# Patient Record
Sex: FEMALE | Race: WHITE | NOT HISPANIC OR LATINO | Employment: FULL TIME | URBAN - METROPOLITAN AREA
[De-identification: names, ages, dates, MRNs, and addresses within clinical notes are randomized per-mention and may not be internally consistent; named-entity substitution may affect disease eponyms.]

---

## 2017-01-30 DIAGNOSIS — Z01.812 PRE-OPERATIVE LABORATORY EXAMINATION: ICD-10-CM

## 2017-01-30 LAB
ANION GAP SERPL CALC-SCNC: 9 MMOL/L (ref 0–11.9)
BASOPHILS # BLD AUTO: 0.8 % (ref 0–1.8)
BASOPHILS # BLD: 0.05 K/UL (ref 0–0.12)
BUN SERPL-MCNC: 15 MG/DL (ref 8–22)
CALCIUM SERPL-MCNC: 9.7 MG/DL (ref 8.5–10.5)
CHLORIDE SERPL-SCNC: 105 MMOL/L (ref 96–112)
CO2 SERPL-SCNC: 22 MMOL/L (ref 20–33)
CREAT SERPL-MCNC: 0.72 MG/DL (ref 0.5–1.4)
EOSINOPHIL # BLD AUTO: 0.46 K/UL (ref 0–0.51)
EOSINOPHIL NFR BLD: 7.3 % (ref 0–6.9)
ERYTHROCYTE [DISTWIDTH] IN BLOOD BY AUTOMATED COUNT: 40.6 FL (ref 35.9–50)
GFR SERPL CREATININE-BSD FRML MDRD: >60 ML/MIN/1.73 M 2
GLUCOSE SERPL-MCNC: 77 MG/DL (ref 65–99)
HCG SERPL QL: NEGATIVE
HCT VFR BLD AUTO: 46.6 % (ref 37–47)
HGB BLD-MCNC: 15.4 G/DL (ref 12–16)
IMM GRANULOCYTES # BLD AUTO: 0.02 K/UL (ref 0–0.11)
IMM GRANULOCYTES NFR BLD AUTO: 0.3 % (ref 0–0.9)
LYMPHOCYTES # BLD AUTO: 1.88 K/UL (ref 1–4.8)
LYMPHOCYTES NFR BLD: 29.8 % (ref 22–41)
MCH RBC QN AUTO: 31.6 PG (ref 27–33)
MCHC RBC AUTO-ENTMCNC: 33 G/DL (ref 33.6–35)
MCV RBC AUTO: 95.5 FL (ref 81.4–97.8)
MONOCYTES # BLD AUTO: 0.49 K/UL (ref 0–0.85)
MONOCYTES NFR BLD AUTO: 7.8 % (ref 0–13.4)
NEUTROPHILS # BLD AUTO: 3.4 K/UL (ref 2–7.15)
NEUTROPHILS NFR BLD: 54 % (ref 44–72)
NRBC # BLD AUTO: 0 K/UL
NRBC BLD AUTO-RTO: 0 /100 WBC
PLATELET # BLD AUTO: 267 K/UL (ref 164–446)
PMV BLD AUTO: 10.8 FL (ref 9–12.9)
POTASSIUM SERPL-SCNC: 3.6 MMOL/L (ref 3.6–5.5)
RBC # BLD AUTO: 4.88 M/UL (ref 4.2–5.4)
SODIUM SERPL-SCNC: 136 MMOL/L (ref 135–145)
WBC # BLD AUTO: 6.3 K/UL (ref 4.8–10.8)

## 2017-01-30 PROCEDURE — 36415 COLL VENOUS BLD VENIPUNCTURE: CPT

## 2017-01-30 PROCEDURE — 84703 CHORIONIC GONADOTROPIN ASSAY: CPT

## 2017-01-30 PROCEDURE — 80048 BASIC METABOLIC PNL TOTAL CA: CPT

## 2017-01-30 PROCEDURE — 85025 COMPLETE CBC W/AUTO DIFF WBC: CPT

## 2017-01-30 RX ORDER — IBUPROFEN 200 MG
600 TABLET ORAL EVERY 6 HOURS PRN
Status: ON HOLD | COMMUNITY
End: 2017-02-06

## 2017-02-06 ENCOUNTER — APPOINTMENT (OUTPATIENT)
Dept: RADIOLOGY | Facility: MEDICAL CENTER | Age: 37
End: 2017-02-06
Attending: OBSTETRICS & GYNECOLOGY
Payer: COMMERCIAL

## 2017-02-06 ENCOUNTER — HOSPITAL ENCOUNTER (OUTPATIENT)
Facility: MEDICAL CENTER | Age: 37
End: 2017-02-07
Attending: OBSTETRICS & GYNECOLOGY | Admitting: OBSTETRICS & GYNECOLOGY
Payer: COMMERCIAL

## 2017-02-06 PROBLEM — N85.2 ENLARGED UTERUS: Status: ACTIVE | Noted: 2017-02-06

## 2017-02-06 PROBLEM — N85.2 ENLARGED UTERUS: Status: RESOLVED | Noted: 2017-02-06 | Resolved: 2017-02-06

## 2017-02-06 LAB
BASOPHILS # BLD AUTO: 0.1 % (ref 0–1.8)
BASOPHILS # BLD: 0.02 K/UL (ref 0–0.12)
EKG IMPRESSION: NORMAL
EOSINOPHIL # BLD AUTO: 0.01 K/UL (ref 0–0.51)
EOSINOPHIL NFR BLD: 0.1 % (ref 0–6.9)
ERYTHROCYTE [DISTWIDTH] IN BLOOD BY AUTOMATED COUNT: 41.1 FL (ref 35.9–50)
HCG UR QL: NEGATIVE
HCT VFR BLD AUTO: 44.9 % (ref 37–47)
HGB BLD-MCNC: 15.4 G/DL (ref 12–16)
IMM GRANULOCYTES # BLD AUTO: 0.04 K/UL (ref 0–0.11)
IMM GRANULOCYTES NFR BLD AUTO: 0.3 % (ref 0–0.9)
LYMPHOCYTES # BLD AUTO: 0.62 K/UL (ref 1–4.8)
LYMPHOCYTES NFR BLD: 4.3 % (ref 22–41)
MCH RBC QN AUTO: 32.7 PG (ref 27–33)
MCHC RBC AUTO-ENTMCNC: 34.3 G/DL (ref 33.6–35)
MCV RBC AUTO: 95.3 FL (ref 81.4–97.8)
MONOCYTES # BLD AUTO: 0.38 K/UL (ref 0–0.85)
MONOCYTES NFR BLD AUTO: 2.7 % (ref 0–13.4)
NEUTROPHILS # BLD AUTO: 13.2 K/UL (ref 2–7.15)
NEUTROPHILS NFR BLD: 92.5 % (ref 44–72)
NRBC # BLD AUTO: 0 K/UL
NRBC BLD AUTO-RTO: 0 /100 WBC
PLATELET # BLD AUTO: 255 K/UL (ref 164–446)
PMV BLD AUTO: 9.9 FL (ref 9–12.9)
RBC # BLD AUTO: 4.71 M/UL (ref 4.2–5.4)
SP GR UR REFRACTOMETRY: 1.02
WBC # BLD AUTO: 14.3 K/UL (ref 4.8–10.8)

## 2017-02-06 PROCEDURE — 88307 TISSUE EXAM BY PATHOLOGIST: CPT

## 2017-02-06 PROCEDURE — G0378 HOSPITAL OBSERVATION PER HR: HCPCS

## 2017-02-06 PROCEDURE — 160035 HCHG PACU - 1ST 60 MINS PHASE I: Performed by: OBSTETRICS & GYNECOLOGY

## 2017-02-06 PROCEDURE — 700102 HCHG RX REV CODE 250 W/ 637 OVERRIDE(OP)

## 2017-02-06 PROCEDURE — 81025 URINE PREGNANCY TEST: CPT

## 2017-02-06 PROCEDURE — 160009 HCHG ANES TIME/MIN: Performed by: OBSTETRICS & GYNECOLOGY

## 2017-02-06 PROCEDURE — 160002 HCHG RECOVERY MINUTES (STAT): Performed by: OBSTETRICS & GYNECOLOGY

## 2017-02-06 PROCEDURE — 500025 HCHG ARMREST, CRADLE FOAM: Performed by: OBSTETRICS & GYNECOLOGY

## 2017-02-06 PROCEDURE — 71275 CT ANGIOGRAPHY CHEST: CPT

## 2017-02-06 PROCEDURE — 110382 HCHG SHELL REV 271: Performed by: OBSTETRICS & GYNECOLOGY

## 2017-02-06 PROCEDURE — 700111 HCHG RX REV CODE 636 W/ 250 OVERRIDE (IP)

## 2017-02-06 PROCEDURE — A9270 NON-COVERED ITEM OR SERVICE: HCPCS | Performed by: OBSTETRICS & GYNECOLOGY

## 2017-02-06 PROCEDURE — 501664 HCHG TUBING, FILTER STRYKER: Performed by: OBSTETRICS & GYNECOLOGY

## 2017-02-06 PROCEDURE — 700111 HCHG RX REV CODE 636 W/ 250 OVERRIDE (IP): Performed by: OBSTETRICS & GYNECOLOGY

## 2017-02-06 PROCEDURE — 71010 DX-CHEST-LIMITED (1 VIEW): CPT

## 2017-02-06 PROCEDURE — 160025 RECOVERY II MINUTES (STATS): Performed by: OBSTETRICS & GYNECOLOGY

## 2017-02-06 PROCEDURE — 93005 ELECTROCARDIOGRAM TRACING: CPT | Performed by: OBSTETRICS & GYNECOLOGY

## 2017-02-06 PROCEDURE — 160042 HCHG SURGERY MINUTES - EA ADDL 1 MIN LEVEL 5: Performed by: OBSTETRICS & GYNECOLOGY

## 2017-02-06 PROCEDURE — 700102 HCHG RX REV CODE 250 W/ 637 OVERRIDE(OP): Performed by: OBSTETRICS & GYNECOLOGY

## 2017-02-06 PROCEDURE — 700101 HCHG RX REV CODE 250

## 2017-02-06 PROCEDURE — A9270 NON-COVERED ITEM OR SERVICE: HCPCS

## 2017-02-06 PROCEDURE — 160031 HCHG SURGERY MINUTES - 1ST 30 MINS LEVEL 5: Performed by: OBSTETRICS & GYNECOLOGY

## 2017-02-06 PROCEDURE — 160046 HCHG PACU - 1ST 60 MINS PHASE II: Performed by: OBSTETRICS & GYNECOLOGY

## 2017-02-06 PROCEDURE — 110371 HCHG SHELL REV 272: Performed by: OBSTETRICS & GYNECOLOGY

## 2017-02-06 PROCEDURE — 503108 HCHG CANNULA SEAL 8.5-13MM: Performed by: OBSTETRICS & GYNECOLOGY

## 2017-02-06 PROCEDURE — 502714 HCHG ROBOTIC SURGERY SERVICES: Performed by: OBSTETRICS & GYNECOLOGY

## 2017-02-06 PROCEDURE — 502240 HCHG MISC OR SUPPLY RC 0272: Performed by: OBSTETRICS & GYNECOLOGY

## 2017-02-06 PROCEDURE — 36415 COLL VENOUS BLD VENIPUNCTURE: CPT

## 2017-02-06 PROCEDURE — A4606 OXYGEN PROBE USED W OXIMETER: HCPCS | Performed by: OBSTETRICS & GYNECOLOGY

## 2017-02-06 PROCEDURE — 93010 ELECTROCARDIOGRAM REPORT: CPT | Performed by: INTERNAL MEDICINE

## 2017-02-06 PROCEDURE — 500868 HCHG NEEDLE, SURGI(VARES): Performed by: OBSTETRICS & GYNECOLOGY

## 2017-02-06 PROCEDURE — 501330 HCHG SET, CYSTO IRRIG TUBING: Performed by: OBSTETRICS & GYNECOLOGY

## 2017-02-06 PROCEDURE — 160048 HCHG OR STATISTICAL LEVEL 1-5: Performed by: OBSTETRICS & GYNECOLOGY

## 2017-02-06 PROCEDURE — 160036 HCHG PACU - EA ADDL 30 MINS PHASE I: Performed by: OBSTETRICS & GYNECOLOGY

## 2017-02-06 PROCEDURE — 160047 HCHG PACU  - EA ADDL 30 MINS PHASE II: Performed by: OBSTETRICS & GYNECOLOGY

## 2017-02-06 PROCEDURE — 501838 HCHG SUTURE GENERAL: Performed by: OBSTETRICS & GYNECOLOGY

## 2017-02-06 PROCEDURE — 85025 COMPLETE CBC W/AUTO DIFF WBC: CPT

## 2017-02-06 PROCEDURE — 502679 HCHG MANIPULATOR, UTRN DAVINCI: Performed by: OBSTETRICS & GYNECOLOGY

## 2017-02-06 RX ORDER — SIMETHICONE 80 MG
80 TABLET,CHEWABLE ORAL EVERY 8 HOURS PRN
Status: DISCONTINUED | OUTPATIENT
Start: 2017-02-06 | End: 2017-02-07 | Stop reason: HOSPADM

## 2017-02-06 RX ORDER — HYDROCODONE BITARTRATE AND ACETAMINOPHEN 5; 325 MG/1; MG/1
1 TABLET ORAL EVERY 4 HOURS PRN
Status: DISCONTINUED | OUTPATIENT
Start: 2017-02-06 | End: 2017-02-06

## 2017-02-06 RX ORDER — OXYCODONE HCL 5 MG/5 ML
SOLUTION, ORAL ORAL
Status: COMPLETED
Start: 2017-02-06 | End: 2017-02-06

## 2017-02-06 RX ORDER — MAGNESIUM HYDROXIDE 1200 MG/15ML
LIQUID ORAL
Status: DISCONTINUED | OUTPATIENT
Start: 2017-02-06 | End: 2017-02-06 | Stop reason: HOSPADM

## 2017-02-06 RX ORDER — IBUPROFEN 600 MG/1
600 TABLET ORAL EVERY 6 HOURS
Status: DISCONTINUED | OUTPATIENT
Start: 2017-02-06 | End: 2017-02-07 | Stop reason: HOSPADM

## 2017-02-06 RX ORDER — SODIUM CHLORIDE, SODIUM LACTATE, POTASSIUM CHLORIDE, CALCIUM CHLORIDE 600; 310; 30; 20 MG/100ML; MG/100ML; MG/100ML; MG/100ML
1000 INJECTION, SOLUTION INTRAVENOUS
Status: COMPLETED | OUTPATIENT
Start: 2017-02-06 | End: 2017-02-06

## 2017-02-06 RX ORDER — IBUPROFEN 200 MG
TABLET ORAL
Status: COMPLETED
Start: 2017-02-06 | End: 2017-02-06

## 2017-02-06 RX ORDER — SODIUM CHLORIDE, SODIUM LACTATE, POTASSIUM CHLORIDE, CALCIUM CHLORIDE 600; 310; 30; 20 MG/100ML; MG/100ML; MG/100ML; MG/100ML
1000 INJECTION, SOLUTION INTRAVENOUS
OUTPATIENT
Start: 2017-02-06

## 2017-02-06 RX ORDER — SODIUM CHLORIDE, SODIUM LACTATE, POTASSIUM CHLORIDE, CALCIUM CHLORIDE 600; 310; 30; 20 MG/100ML; MG/100ML; MG/100ML; MG/100ML
INJECTION, SOLUTION INTRAVENOUS
Status: DISCONTINUED | OUTPATIENT
Start: 2017-02-06 | End: 2017-02-06 | Stop reason: HOSPADM

## 2017-02-06 RX ORDER — BUPIVACAINE HYDROCHLORIDE AND EPINEPHRINE 2.5; 5 MG/ML; UG/ML
INJECTION, SOLUTION EPIDURAL; INFILTRATION; INTRACAUDAL; PERINEURAL
Status: DISCONTINUED | OUTPATIENT
Start: 2017-02-06 | End: 2017-02-06 | Stop reason: HOSPADM

## 2017-02-06 RX ORDER — OXYCODONE HYDROCHLORIDE AND ACETAMINOPHEN 5; 325 MG/1; MG/1
1 TABLET ORAL EVERY 4 HOURS PRN
Status: DISCONTINUED | OUTPATIENT
Start: 2017-02-06 | End: 2017-02-07 | Stop reason: HOSPADM

## 2017-02-06 RX ORDER — FUROSEMIDE 10 MG/ML
INJECTION INTRAMUSCULAR; INTRAVENOUS
Status: COMPLETED
Start: 2017-02-06 | End: 2017-02-06

## 2017-02-06 RX ORDER — METOCLOPRAMIDE HYDROCHLORIDE 5 MG/ML
10 INJECTION INTRAMUSCULAR; INTRAVENOUS EVERY 4 HOURS PRN
Status: DISCONTINUED | OUTPATIENT
Start: 2017-02-06 | End: 2017-02-07 | Stop reason: HOSPADM

## 2017-02-06 RX ORDER — FUROSEMIDE 10 MG/ML
10 INJECTION INTRAMUSCULAR; INTRAVENOUS ONCE
Status: COMPLETED | OUTPATIENT
Start: 2017-02-06 | End: 2017-02-06

## 2017-02-06 RX ORDER — SODIUM CHLORIDE, SODIUM LACTATE, POTASSIUM CHLORIDE, CALCIUM CHLORIDE 600; 310; 30; 20 MG/100ML; MG/100ML; MG/100ML; MG/100ML
INJECTION, SOLUTION INTRAVENOUS CONTINUOUS
Status: DISCONTINUED | OUTPATIENT
Start: 2017-02-06 | End: 2017-02-07 | Stop reason: HOSPADM

## 2017-02-06 RX ORDER — LIDOCAINE HYDROCHLORIDE 10 MG/ML
INJECTION, SOLUTION INFILTRATION; PERINEURAL
Status: COMPLETED
Start: 2017-02-06 | End: 2017-02-06

## 2017-02-06 RX ORDER — ACETAMINOPHEN 500 MG
1000 TABLET ORAL 2 TIMES DAILY PRN
COMMUNITY
End: 2018-10-10

## 2017-02-06 RX ORDER — HYDROCODONE BITARTRATE AND ACETAMINOPHEN 5; 325 MG/1; MG/1
2 TABLET ORAL EVERY 6 HOURS PRN
Status: DISCONTINUED | OUTPATIENT
Start: 2017-02-06 | End: 2017-02-06

## 2017-02-06 RX ORDER — OXYCODONE HYDROCHLORIDE AND ACETAMINOPHEN 5; 325 MG/1; MG/1
TABLET ORAL
Status: COMPLETED
Start: 2017-02-06 | End: 2017-02-06

## 2017-02-06 RX ORDER — ONDANSETRON 2 MG/ML
4 INJECTION INTRAMUSCULAR; INTRAVENOUS EVERY 6 HOURS PRN
Status: DISCONTINUED | OUTPATIENT
Start: 2017-02-06 | End: 2017-02-07 | Stop reason: HOSPADM

## 2017-02-06 RX ORDER — PROMETHAZINE HYDROCHLORIDE 12.5 MG/1
12.5 SUPPOSITORY RECTAL EVERY 4 HOURS PRN
Status: DISCONTINUED | OUTPATIENT
Start: 2017-02-06 | End: 2017-02-07 | Stop reason: HOSPADM

## 2017-02-06 RX ADMIN — OXYCODONE AND ACETAMINOPHEN 1 TABLET: 5; 325 TABLET ORAL at 19:30

## 2017-02-06 RX ADMIN — FUROSEMIDE 10 MG: 10 INJECTION, SOLUTION INTRAVENOUS at 23:21

## 2017-02-06 RX ADMIN — LIDOCAINE HYDROCHLORIDE 0.3 ML: 10 INJECTION, SOLUTION INFILTRATION; PERINEURAL at 07:15

## 2017-02-06 RX ADMIN — FUROSEMIDE 10 MG: 10 INJECTION, SOLUTION INTRAMUSCULAR; INTRAVENOUS at 16:45

## 2017-02-06 RX ADMIN — IBUPROFEN 600 MG: 600 TABLET, FILM COATED ORAL at 18:00

## 2017-02-06 RX ADMIN — IBUPROFEN 600 MG: 200 TABLET, FILM COATED ORAL at 18:00

## 2017-02-06 RX ADMIN — OXYCODONE HYDROCHLORIDE AND ACETAMINOPHEN 1 TABLET: 5; 325 TABLET ORAL at 19:30

## 2017-02-06 RX ADMIN — OXYCODONE HYDROCHLORIDE 10 MG: 5 SOLUTION ORAL at 10:35

## 2017-02-06 RX ADMIN — IBUPROFEN 600 MG: 600 TABLET, FILM COATED ORAL at 23:23

## 2017-02-06 RX ADMIN — FENTANYL CITRATE 25 MCG: 50 INJECTION, SOLUTION INTRAMUSCULAR; INTRAVENOUS at 14:45

## 2017-02-06 RX ADMIN — FENTANYL CITRATE 25 MCG: 50 INJECTION, SOLUTION INTRAMUSCULAR; INTRAVENOUS at 10:30

## 2017-02-06 RX ADMIN — SODIUM CHLORIDE, SODIUM LACTATE, POTASSIUM CHLORIDE, CALCIUM CHLORIDE 1000 ML: 600; 310; 30; 20 INJECTION, SOLUTION INTRAVENOUS at 07:15

## 2017-02-06 RX ADMIN — FENTANYL CITRATE 25 MCG: 50 INJECTION, SOLUTION INTRAMUSCULAR; INTRAVENOUS at 10:57

## 2017-02-06 RX ADMIN — FUROSEMIDE 10 MG: 10 INJECTION INTRAMUSCULAR; INTRAVENOUS at 16:45

## 2017-02-06 RX ADMIN — FENTANYL CITRATE 25 MCG: 50 INJECTION, SOLUTION INTRAMUSCULAR; INTRAVENOUS at 20:45

## 2017-02-06 ASSESSMENT — PAIN SCALES - GENERAL
PAINLEVEL_OUTOF10: 2
PAINLEVEL_OUTOF10: 3
PAINLEVEL_OUTOF10: 2
PAINLEVEL_OUTOF10: 1
PAINLEVEL_OUTOF10: 5
PAINLEVEL_OUTOF10: 0
PAINLEVEL_OUTOF10: 4
PAINLEVEL_OUTOF10: 4
PAINLEVEL_OUTOF10: 5
PAINLEVEL_OUTOF10: 1

## 2017-02-06 ASSESSMENT — LIFESTYLE VARIABLES
ALCOHOL_USE: NO
EVER_SMOKED: NEVER

## 2017-02-06 NOTE — H&P
DATE OF OPERATION:  2017    CHIEF COMPLAINT:  Heavy painful periods, pelvic pressure, enlarged fibroid   uterus, heterogeneous uterus, rule out adenomyosis.    HISTORY OF PRESENT ILLNESS:  Patient is a 36-year-old , who presented to   my office for a second opinion regarding hysterectomy.  The patient states   that she has been having pain and really heavy periods which are closer   together over the last year.  She uses a super plus tampon and a super plus   pad and is so heavy that she flows through  she has clots which are getting   bigger.  She complains of discomfort and pressure.  Every day she has a   pressure sensation.  She was seen for a Pap smear by Dr. Frazier and this   returned as normal.  She was sent for pelvic ultrasound.  This was done on   2016 and this showed the uterus measuring 8.9x5.5x8x4.4 cm with the   myometrium that was inhomogeneous.  There was also a 4x3.6x4.3 cm myometrial   lesion consistent with a uterine fibroids and a smaller one measuring 1-2 cm   in diameter.  The lining was 1.12 cm and the ovaries appeared normal.  The   patient was seen by Dr. Castellon and recommended to have a vaginal hysterectomy.    She came to me for a second opinion.  The patient was tried on Aygestin to try   to stop her bleeding and she was quite tired on it, but her periods were   better.  After discussion of the treatment options, the patient has elected to   proceed with a da Fatimah laparoscopic total hysterectomy with ovarian   preservation if possible.  Risks, benefits, alternatives and procedure were   discussed with the patient in detail and she agrees to proceed.    PAST MEDICAL HISTORY:  Anemia, kidney stones, a DVT in 2015 from a PICC   line her right arm.  She also was diagnosed with an autoimmune disease.  She   sought alternative treatment and now is doing very well for.  She does have   anxiety.    PAST SURGICAL HISTORY:  None.    MENSTRUAL HISTORY:  The patient underwent  menarche at age 10.  She has periods   every 24 days.  She flows for 6-7 days.  They are quite heavy and painful.    PAST OBSTETRICAL HISTORY:  She has had 2 vaginal deliveries, the last one in   .    MEDICATIONS:  Cymbalta daily.    FAMILY HISTORY:  Father  at age 52 from a heart attack and mother has   lupus and RA.  Sister at 43 had a kidney transplant at 32, maternal   grandmother with breast cancer at 36 years old.    SOCIAL HISTORY:  The patient does not smoke, drinks 1-2 drinks nightly with   dinner.    ALLERGIES:  ADHESIVES.  No known drug allergies.    PHYSICAL EXAMINATION:  VITAL SIGNS:  The patient's blood pressure is 130/70, her weight is 172,   height is 5.8 and three quarters, please see EPIC vitals.  HEENT:  Within normal limits.  NECK:  Supple, without lymphadenopathy or thyromegaly.  CARDIOVASCULAR:  Regular rate and rhythm.  LUNGS:  Clear to auscultation.  BACK:  No CVA tenderness.  ABDOMEN:  Soft and nontender, nondistended.  No masses are palpable.  PELVIC:  EGBUS appears normal.  Vagina appears normal.  Cervix appears to be   high.  The uterus is high and hard to feel.  No masses are palpable.  RECTAL:  On rectal examination, the posterior cul-de-sac.  There is some   fullness and pressure with examination.  EXTREMITIES:  Benign.    ASSESSMENT:  1.  A 35-year-old .  2.  Pelvic pressure and enlarged fibroid uterus.  3.  Heavy painful periods over the last year.  4.  Constipation.  5.  Frequency of urination.  6.  Enlarged fibroid uterus.  7.  Heterogeneous uterus.    PLAN:  The patient is scheduled for a da Fatimah laparoscopic total hysterectomy   with ovarian preservation if possible.  We will try reduced port if possible.    Risks, benefits, alternatives and procedure were discussed with the patient   in detail and she agrees to proceed.  Preoperative labs will be obtained.    Preoperative antibiotics will be administered.  She was given a prescription   for Percocet 5/325, #60 and  Phenergan suppositories 25 mg #6.       ____________________________________     MD HERNANDEZ CHICAS / REG    DD:  02/05/2017 21:37:55  DT:  02/05/2017 23:21:33    D#:  [id number]  Job#:  [id number]    cc: GABRIELLA SOTO MD

## 2017-02-06 NOTE — IP AVS SNAPSHOT
Egodeus Access Code: Activation code not generated  Current Egodeus Status: Active    [employer]art  A secure, online tool to manage your health information     [employer]’s Egodeus® is a secure, online tool that connects you to your personalized health information from the privacy of your home -- day or night - making it very easy for you to manage your healthcare. Once the activation process is completed, you can even access your medical information using the Egodeus christie, which is available for free in the Apple Christie store or Google Play store.     Egodeus provides the following levels of access (as shown below):   My Chart Features   [facility] Primary Care Doctor [facility]  Specialists [facility]  Urgent  Care Non-[facility]  Primary Care  Doctor   Email your healthcare team securely and privately 24/7 X X X X   Manage appointments: schedule your next appointment; view details of past/upcoming appointments X      Request prescription refills. X      View recent personal medical records, including lab and immunizations X X X X   View health record, including health history, allergies, medications X X X X   Read reports about your outpatient visits, procedures, consult and ER notes X X X X   See your discharge summary, which is a recap of your hospital and/or ER visit that includes your diagnosis, lab results, and care plan. X X       How to register for Egodeus:  1. Go to  [email or web address].  2. Click on the Sign Up Now box, which takes you to the New Member Sign Up page. You will need to provide the following information:  a. Enter your Egodeus Access Code exactly as it appears at the top of this page. (You will not need to use this code after you’ve completed the sign-up process. If you do not sign up before the expiration date, you must request a new code.)   b. Enter your date of birth.   c. Enter your home email address.   d. Click Submit, and follow the next screen’s instructions.  3. Create a Egodeus ID. This will  be your InOpen login ID and cannot be changed, so think of one that is secure and easy to remember.  4. Create a InOpen password. You can change your password at any time.  5. Enter your Password Reset Question and Answer. This can be used at a later time if you forget your password.   6. Enter your e-mail address. This allows you to receive e-mail notifications when new information is available in InOpen.  7. Click Sign Up. You can now view your health information.    For assistance activating your InOpen account, call [phone]

## 2017-02-06 NOTE — OR NURSING
Back from CT scan decreased sob when laying down, 02 sats now 98% on 2 liters, HR  to 113  Pain however is increasing in upper abd 3/10   Mild bleeding from surgical site in abd at belly button   Dr Dailey notified of pt status  EKG at bedside

## 2017-02-06 NOTE — OR NURSING
trialed pt off of o2   Pt desating to 86 %  Lungs clear all fields   HR increasing to 135 with talking  md Dr. Dailey notified

## 2017-02-06 NOTE — OR SURGEON
Immediate Post-Operative Note      PreOp Diagnosis: Pelvic pressure, Heavy painful periods, pelvic pressure, frequency of urination    PostOp Diagnosis: Same    Procedure(s):  HYSTERECTOMY ROBOTIC SINGLE SITE PLUS ONE BILATERAL SALPINGECTOMY  - Wound Class: Clean Contaminated  MODIFIED MCCALLS CULDOPLASTY  CYSTOSCOPY - Wound Class: Clean Contaminated    Surgeon(s):  Zaynab Dailey M.D.    Anesthesiologist/Type of Anesthesia:  Anesthesiologist: Rodolfo Gillis M.D./General    Surgical Staff:  Assistant: Andre Murray R.N.  Circulator: Maciej Rivera R.N.; Ashley Adair R.N.  Relief Circulator: Erasmo Jauregui R.N.  Scrub Person: Natty Mc; Yesenia Verma    Specimen: Uterus, bilateral tubes    Estimated Blood Loss: 20 cc    Findings: Uterus is enlarged with 5 cm posterior fibroid off to the right side, normal tubes and ovaries, normal bladder postop, great support and vaginal length    Complications: None        2/6/2017 9:48 AM Zaynab Dailey

## 2017-02-06 NOTE — OR NURSING
Arrived from pacu alert and oriented x 4, smiling, reporting pain 1-2/10 lower abd pain at this time.   at bedside  Olmstead in place   Initial vitals taken, 02 sat 87 % pt placed on 1 liter NC oxygen and o2 sat increased to 97% informed pt about discharge process and expectations that needed to be met prior to being released home. She and  understanding of instructions. Pt given 8 0z cup of ginger ale and otter pop, bed rails up and call light in place

## 2017-02-06 NOTE — OP REPORT
DATE OF SERVICE:  02/06/2017    PREOPERATIVE DIAGNOSES:  Pelvic pressure, heavy painful periods, pelvic pain,   frequency of urination.    POSTOPERATIVE DIAGNOSES:  Pelvic pressure, heavy painful periods, pelvic pain,   frequency of urination.    PROCEDURES PERFORMED:  Da Fatimah laparoscopic total hysterectomy using single   site +1 bilateral salpingectomy, modified Casiano's culdoplasty, and   cystoscopy.    SURGEON:  Zaynab Dailey MD    ASSISTANT:  JULIET Jimenez    ANESTHESIOLOGIST:  Rodolfo Lafleur MD    ANESTHESIA:  General endotracheal.    ESTIMATED BLOOD LOSS:  20 mL.    SPECIMEN:  Uterus, bilateral tubes.    FINDINGS:  The uterus was enlarged with a 5 cm posterior off to the right   fibroid.  The tubes and ovaries were normal.  There was a normal bladder with   good efflux of sodium fluoroscein through the ureteral os bilaterally on   cystoscopic examination and there was great support and vaginal length   postoperatively.    COMPLICATIONS:  None.    TECHNIQUE:  The patient was taken to the operating room where general   anesthesia was placed.  She was then placed in the Lei stirru with   excellent position, prepped and draped in normal sterile fashion.  A V-Care   uterine manipulator was then placed without difficulty.  Attention was then   turned to the abdomen where 0.25% Marcaine with epinephrine was then injected   at the base of the umbilicus.  A 1 cm incision was made slightly off the   midline where she had a supraumbilical belly button piercing.  This was placed   across the base of the belly button and the Veress needle was placed.    Pneumoperitoneum created with CO2 gas.  The trocar introduced and the above   findings were noted.  I did feel like I could do this single site or single   site +1, so I went ahead and opened up, took the base of the belly button off   and then opened the fascia about 2.5 cm and then the peritoneum as well.  I   then placed the mini GelPort without  difficulty and then placed the GelPort on   top of that Dank retractor with the trocars in it and then brought the da   Fatimah to the patient's right side and side docking took place without   difficulty.  The Maryland was in the right arm and the monopolar hook was in   the left arm.  A 30-degree down scope was used for this procedure.  Once the   instruments were in appropriate location, I went to the da Fatimah console.  The   patient had elected to maintain her ovaries if possible.  I saw no reason not   to.  The fallopian tubes were dissected and left attached to the uterus.  I   then took down the uteroovarian ligaments, the round ligaments, intervening   tissue.  The anterior leaf of the broad ligament incised, posterior leaf   incised, uterine vessels were then clamped, desiccated, and cut.  On that   right side, it was difficult to get the bladder flap down and get to that   right side, so I did decide to put an additional port and on the right lower   quadrant.  This was placed under direct visualization and then I used   instruments through this.  First, a Maryland and then a kassandra suture cut.  I   then took down the bladder flap and did my colpotomy incision with the   monopolar cautery.  Once this was accomplished, the uterus was delivered   through the vagina without difficulty.  I then had my assistant introduce the   2-0 barbed suture through the +1 trocar.  This was placed at each of the cuff   angles incorporating the uterosacral ligament on each side.  One of these   sutures was run to reapproximate the vaginal mucosa, the other the endopelvic   fascia.  Excellent reapproximation was achieved.  I then used the very end of   it to plicate the uterosacral ligament from the midline and bring the   peritoneum over this area.  Irrigation was then performed after I removed the   da Fatimah trocars and scrubbed back into the case.  The irrigation and   aspiration of the fluid was performed.  There was no  bleeding.  I reduced the   pneumoperitoneum to ensure there was no bleeding from the uterine vessels.    There was no bleeding.  The instruments were removed and the umbilical fascia   was reapproximated with 0 Vicryl in a running fashion.  The belly button was   then reattached to the fascia using 2-0 Vicryl and skin was closed with   Dermabond.  Excellent reapproximation was achieved.  Some additional numbing   medicine was placed around these incisions.  After IV administration of sodium   fluorescein, cystoscopic examination of bladder revealed a normal bladder   with good efflux of urine through the ureteral os bilaterally.  The cystoscope   was then removed, Olmstead catheter replaced and a Graves speculum was used to   view the vagina, this appeared normal with excellent length and no bleeding or   lacerations.  Patient tolerated the procedure very well and was brought to   recovery room in stable condition.       ____________________________________     MD HERNANDEZ CHICAS / REG    DD:  02/06/2017 09:57:58  DT:  02/06/2017 13:24:41    D#:  [id number]  Job#:  [id number]

## 2017-02-06 NOTE — IP AVS SNAPSHOT
" Home Care Instructions                                                                                                                  Name:Tiana Martinez  Medical Record Number:[id number]  CSN: [id number]    YOB: 1980   Age: 36 y.o.  Sex: female  HT:1.753 m (5' 9.02\") WT: 75.751 kg (167 lb)          Admit Date: 2/6/2017     Discharge Date:   Today's Date: 2/7/2017  Attending Doctor:  Zaynab Dailey M.D.                  Allergies:  Chloraprep one step; Codeine; Tramadol; Turmeric; Mobic; Gluten meal; Lactose; Other misc; Sulfa drugs; Tape; and Tree nuts food allergy            Discharge Instructions       Discharge Instructions    Discharged to home by car with relative. Discharged via wheelchair, hospital escort: Yes.  Special equipment needed: Not Applicable    Be sure to schedule a follow-up appointment with your primary care doctor or any specialists as instructed.     Discharge Plan:   Diet Plan: Discussed  Activity Level: Discussed  Confirmed Follow up Appointment: Appointment Scheduled  Confirmed Symptoms Management: Discussed  Medication Reconciliation Updated: Yes  Influenza Vaccine Indication: Patient Refuses    I understand that a diet low in cholesterol, fat, and sodium is recommended for good health. Unless I have been given specific instructions below for another diet, I accept this instruction as my diet prescription.   Other diet: Regular diet as tolerated    Special Instructions: None    · Is patient discharged on Warfarin / Coumadin?   No     · Is patient Post Blood Transfusion?  No    Depression / Suicide Risk    As you are discharged from this [facility] facility, it is important to learn how to keep safe from harming yourself.    Recognize the warning signs:  · Abrupt changes in personality, positive or negative- including increase in energy   · Giving away possessions  · Change in eating patterns- significant weight changes-  positive or negative  · Change in " sleeping patterns- unable to sleep or sleeping all the time   · Unwillingness or inability to communicate  · Depression  · Unusual sadness, discouragement and loneliness  · Talk of wanting to die  · Neglect of personal appearance   · Rebelliousness- reckless behavior  · Withdrawal from people/activities they love  · Confusion- inability to concentrate     If you or a loved one observes any of these behaviors or has concerns about self-harm, here's what you can do:  · Talk about it- your feelings and reasons for harming yourself  · Remove any means that you might use to hurt yourself (examples: pills, rope, extension cords, firearm)  · Get professional help from the community (Mental Health, Substance Abuse, psychological counseling)  · Do not be alone:Call your Safe Contact- someone whom you trust who will be there for you.  · Call your local CRISIS HOTLINE 922-1162 or 382-446-3243  · Call your local Children's Mobile Crisis Response Team [place] (509) 684-8728 or www.[employer]  · Call the toll free National Suicide Prevention Hotlines   · National Suicide Prevention Lifeline 258-051-FKQM (5305)  · [place] Line Network 800-SUICIDE (703-5992)            Hysterectomy Information   A hysterectomy is a surgery in which your uterus is removed. This surgery may be done to treat various medical problems. After the surgery, you will no longer have menstrual periods. The surgery will also make you unable to become pregnant (sterile). The fallopian tubes and ovaries can be removed (bilateral salpingo-oophorectomy) during this surgery as well.   REASONS FOR A HYSTERECTOMY  · Persistent, abnormal bleeding.  · Lasting (chronic) pelvic pain or infection.  · The lining of the uterus (endometrium) starts growing outside the uterus (endometriosis).  · The endometrium starts growing in the muscle of the uterus (adenomyosis).  · The uterus falls down into the vagina (pelvic organ prolapse).  · Noncancerous growths  in the uterus (uterine fibroids) that cause symptoms.  · Precancerous cells.  · Cervical cancer or uterine cancer.  TYPES OF HYSTERECTOMIES  2. Supracervical hysterectomy--In this type, the top part of the uterus is removed, but not the cervix.  3. Total hysterectomy--The uterus and cervix are removed.  4. Radical hysterectomy--The uterus, the cervix, and the fibrous tissue that holds the uterus in place in the pelvis (parametrium) are removed.  WAYS A HYSTERECTOMY CAN BE PERFORMED  2. Abdominal hysterectomy--A large surgical cut (incision) is made in the abdomen. The uterus is removed through this incision.  3. Vaginal hysterectomy--An incision is made in the vagina. The uterus is removed through this incision. There are no abdominal incisions.  4. Conventional laparoscopic hysterectomy--Three or four small incisions are made in the abdomen. A thin, lighted tube with a camera (laparoscope) is inserted into one of the incisions. Other tools are put through the other incisions. The uterus is cut into small pieces. The small pieces are removed through the incisions, or they are removed through the vagina.  5. Laparoscopically assisted vaginal hysterectomy (LAVH)--Three or four small incisions are made in the abdomen. Part of the surgery is performed laparoscopically and part vaginally. The uterus is removed through the vagina.  6. Robot-assisted laparoscopic hysterectomy--A laparoscope and other tools are inserted into 3 or 4 small incisions in the abdomen. A computer-controlled device is used to give the surgeon a 3D image and to help control the surgical instruments. This allows for more precise movements of surgical instruments. The uterus is cut into small pieces and removed through the incisions or removed through the vagina.  RISKS AND COMPLICATIONS   Possible complications associated with this procedure include:  2. Bleeding and risk of blood transfusion. Tell your health care provider if you do not want to  receive any blood products.  3. Blood clots in the legs or lung.  4. Infection.  5. Injury to surrounding organs.  6. Problems or side effects related to anesthesia.  7. Conversion to an abdominal hysterectomy from one of the other techniques.  WHAT TO EXPECT AFTER A HYSTERECTOMY  2. You will be given pain medicine.  3. You will need to have someone with you for the first 3-5 days after you go home.  4. You will need to follow up with your surgeon in 2-4 weeks after surgery to evaluate your progress.  5. You may have early menopause symptoms such as hot flashes, night sweats, and insomnia.  6. If you had a hysterectomy for a problem that was not cancer or not a condition that could lead to cancer, then you no longer need Pap tests. However, even if you no longer need a Pap test, a regular exam is a good idea to make sure no other problems are starting.     This information is not intended to replace advice given to you by your health care provider. Make sure you discuss any questions you have with your health care provider.     Document Released: 06/13/2002 Document Revised: 10/08/2014 Document Reviewed: 08/25/2014  [employer] Interactive Patient Education ©2016 [employer]    ACTIVITY: Rest and take it easy for the first 24 hours.  A responsible adult is recommended to remain with you during that time.  It is normal to feel sleepy.  We encourage you to not do anything that requires balance, judgment or coordination.    MILD FLU-LIKE SYMPTOMS ARE NORMAL. YOU MAY EXPERIENCE GENERALIZED MUSCLE ACHES, THROAT IRRITATION, HEADACHE AND/OR SOME NAUSEA.    FOR 24 HOURS DO NOT:  Drive, operate machinery or run household appliances.  Drink beer or alcoholic beverages.   Make important decisions or sign legal documents.    SPECIAL INSTRUCTIONS:   *Pelvic rest for 6 wks (Nothing vaginally: intercourse, tampons, douching) or until MD gives approval.   *Keep bowels soft and regular: use Milk of Magnesia, OR Colace over the  counter twice daily as needed for constipation.      DIET: To avoid nausea, slowly advance diet as tolerated, avoiding spicy or greasy foods for the first day.  Add more substantial food to your diet according to your physician's instructions.  Babies can be fed formula or breast milk as soon as they are hungry.  INCREASE FLUIDS AND FIBER TO AVOID CONSTIPATION.    SURGICAL DRESSING/BATHING: May shower or take baths.    FOLLOW-UP APPOINTMENT:  A follow-up appointment should be arranged with your doctor call to schedule.    You should CALL YOUR PHYSICIAN if you develop:  Fever greater than 101 degrees F.  Pain not relieved by medication, or persistent nausea or vomiting.  Excessive bleeding (blood soaking through dressing) or unexpected drainage from the wound.  Extreme redness or swelling around the incision site, drainage of pus or foul smelling drainage.  Inability to urinate or empty your bladder within 8 hours.  Problems with breathing or chest pain.    You should call 911 if you develop problems with breathing or chest pain.  If you are unable to contact your doctor or surgical center, you should go to the nearest emergency room or urgent care center.  Physician's telephone #: Dr Dailey  [phone]    If any questions arise, call your doctor.  If your doctor is not available, please feel free to call the Surgical Center at [phone].  The Center is open Monday through Friday from 7AM to 7PM.  You can also call the [facility] HOTLINE open 24 hours/day, 7 days/week and speak to a nurse at [phone], or toll free at [phone].    A registered nurse may call you a few days after your surgery to see how you are doing after your procedure.    MEDICATIONS: Resume taking daily medication.  Take prescribed pain medication with food.  If no medication is prescribed, you may take non-aspirin pain medication if needed.  PAIN MEDICATION CAN BE VERY CONSTIPATING.  Take a stool softener or laxative such as senokot,  pericolace, or milk of magnesia if needed.    Prescription given for (patient has at home).  Last pain medication given at 2PM.    If your physician has prescribed pain medication that includes Acetaminophen (Tylenol), do not take additional Acetaminophen (Tylenol) while taking the prescribed medication.    Depression / Suicide Risk    As you are discharged from this [facility] facility, it is important to learn how to keep safe from harming yourself.    Recognize the warning signs:  · Abrupt changes in personality, positive or negative- including increase in energy   · Giving away possessions  · Change in eating patterns- significant weight changes-  positive or negative  · Change in sleeping patterns- unable to sleep or sleeping all the time   · Unwillingness or inability to communicate  · Depression  · Unusual sadness, discouragement and loneliness  · Talk of wanting to die  · Neglect of personal appearance   · Rebelliousness- reckless behavior  · Withdrawal from people/activities they love  · Confusion- inability to concentrate     If you or a loved one observes any of these behaviors or has concerns about self-harm, here's what you can do:  · Talk about it- your feelings and reasons for harming yourself  · Remove any means that you might use to hurt yourself (examples: pills, rope, extension cords, firearm)  · Get professional help from the community (Mental Health, Substance Abuse, psychological counseling)  · Do not be alone:Call your Safe Contact- someone whom you trust who will be there for you.  · Call your local CRISIS HOTLINE 275-1167 or 387-663-1063  · Call your local Children's Mobile Crisis Response Team [place] (409) 241-2641 or www.[employer]  · Call the toll free National Suicide Prevention Hotlines   · National Suicide Prevention Lifeline 914-622-ZCEN (8889)  · National Hope Line Network 800-SUICIDE (729-6879)    Follow-up Information     1. Follow up with Zaynab Dailey M.D..      Specialty:  Gynecology    Contact information    [address] [place] [address]  [phone]           Discharge Medication Instructions:    Below are the medications your physician expects you to take upon discharge:    Review all your home medications and newly ordered medications with your doctor and/or pharmacist. Follow medication instructions as directed by your doctor and/or pharmacist.    Please keep your medication list with you and share with your physician.               Medication List      CONTINUE taking these medications        Instructions    acetaminophen 500 MG Tabs   Commonly known as:  TYLENOL    Take 1,000 mg by mouth 2 times a day as needed.   Dose:  1000 mg       CYMBALTA 60 MG Cpep delayed-release capsule   Generic drug:  duloxetine    Take 60 mg by mouth every morning.   Dose:  60 mg               Instructions           Diet / Nutrition:    Follow any diet instructions given to you by your doctor or the dietician, including how much salt (sodium) you are allowed each day.    If you are overweight, talk to your doctor about a weight reduction plan.    Activity:    Remain physically active following your doctor's instructions about exercise and activity.    Rest often.     Any time you become even a little tired or short of breath, SIT DOWN and rest.    Worsening Symptoms:    Report any of the following signs and symptoms to the doctor's office immediately:    *Pain of jaw, arm, or neck  *Chest pain not relieved by medication                               *Dizziness or loss of consciousness  *Difficulty breathing even when at rest   *More tired than usual                                       *Bleeding drainage or swelling of surgical site  *Swelling of feet, ankles, legs or stomach                 *Fever (>100ºF)  *Pink or blood tinged sputum  *Weight gain (3lbs/day or 5lbs /week)           *Shock from internal defibrillator (if applicable)  *Palpitations or irregular  heartbeats                *Cool and/or numb extremities    Stroke Awareness    Common Risk Factors for Stroke include:    Age  Atrial Fibrillation  Carotid Artery Stenosis  Diabetes Mellitus  Excessive alcohol consumption  High blood pressure  Overweight   Physical inactivity  Smoking    Warning signs and symptoms of a stroke include:    *Sudden numbness or weakness of the face, arm or leg (especially on one side of the body).  *Sudden confusion, trouble speaking or understanding.  *Sudden trouble seeing in one or both eyes.  *Sudden trouble walking, dizziness, loss of balance or coordination.Sudden severe headache with no known cause.    It is very important to get treatment quickly when a stroke occurs. If you experience any of the above warning signs, call 911 immediately.                   Disclaimer         Quit Smoking / Tobacco Use:    I understand the use of any tobacco products increases my chance of suffering from future heart disease or stroke and could cause other illnesses which may shorten my life. Quitting the use of tobacco products is the single most important thing I can do to improve my health. For further information on smoking / tobacco cessation call a Toll Free Quit Line at 1-831.927.5535 (*National Cancer [place]) or 1-461.305.1337 (American Lung Association) or you can access the web based program at www.lungusa.org.    [place] Tobacco Users Help Line:  (669) 140-2625       Toll Free: 1-487.462.2541  Quit Tobacco Program [facility] (244)319-9465    Crisis Hotline:    [place] Crisis Hotline:  5-110-UCMOTYG or 1-988.617.9361    [place] Crisis Hotline:    1-412.829.3535 or 279-034-8382    Discharge Survey:   Thank you for choosing [facility]. We hope we did everything we could to make your hospital stay a pleasant one. You may be receiving a phone survey and we would appreciate your time and participation in answering the questions. Your input is very valuable to us  in our efforts to improve our service to our patients and their families.        My signature on this form indicates that:    1. I have reviewed and understand the above information.  2. My questions regarding this information have been answered to my satisfaction.  3. I have formulated a plan with my discharge nurse to obtain my prescribed medications for home.                  Disclaimer         __________________________________                     __________       ________                       Patient Signature                                                 Date                    Time

## 2017-02-06 NOTE — IP AVS SNAPSHOT
2/7/2017          Tiana Martinez  [address] [place] [address]    Dear Tiana:    [facility] wants to ensure your discharge home is safe and you or your loved ones have had all your questions answered regarding your care after you leave the hospital.    You may receive a telephone call within two days of your discharge.  This call is to make certain you understand your discharge instructions as well as ensure we provided you with the best care possible during your stay with us.     The call will only last approximately 3-5 minutes and will be done by a nurse.    Once again, we want to ensure your discharge home is safe and that you have a clear understanding of any next steps in your care.  If you have any questions or concerns, please do not hesitate to contact us, we are here for you.  Thank you for choosing [facility] [place] for your healthcare needs.    Sincerely,    Silas Kingston    [facility]

## 2017-02-06 NOTE — IP AVS SNAPSHOT
" <p align=\"LEFT\"><IMG SRC=\"[email or web address]\" alt=\"Image\" WIDTH=\"50%\" HEIGHT=\"200\" BORDER=\"\"></p>                   Name:Tiana Martinez  Medical Record Number:[id number]  CSN: [id number]    YOB: 1980   Age: 36 y.o.  Sex: female  HT:1.753 m (5' 9.02\") WT: 75.751 kg (167 lb)          Admit Date: 2/6/2017     Discharge Date:   Today's Date: 2/7/2017  Attending Doctor:  Zaynab Dailey M.D.                  Allergies:  Chloraprep one step; Codeine; Tramadol; Turmeric; Mobic; Gluten meal; Lactose; Other misc; Sulfa drugs; Tape; and Tree nuts food allergy          Follow-up Information     1. Follow up with Zaynab Dailey M.D..    Specialty:  Gynecology    Contact information    [address] [facility] [place] [address]  [phone]           Medication List      Take these Medications        Instructions    acetaminophen 500 MG Tabs   Commonly known as:  TYLENOL    Take 1,000 mg by mouth 2 times a day as needed.   Dose:  1000 mg       CYMBALTA 60 MG Cpep delayed-release capsule   Generic drug:  duloxetine    Take 60 mg by mouth every morning.   Dose:  60 mg         "

## 2017-02-07 VITALS
SYSTOLIC BLOOD PRESSURE: 115 MMHG | DIASTOLIC BLOOD PRESSURE: 67 MMHG | HEIGHT: 69 IN | WEIGHT: 167 LBS | TEMPERATURE: 98.2 F | OXYGEN SATURATION: 100 % | BODY MASS INDEX: 24.73 KG/M2 | HEART RATE: 81 BPM | RESPIRATION RATE: 16 BRPM

## 2017-02-07 PROCEDURE — 700111 HCHG RX REV CODE 636 W/ 250 OVERRIDE (IP): Performed by: OBSTETRICS & GYNECOLOGY

## 2017-02-07 PROCEDURE — A9270 NON-COVERED ITEM OR SERVICE: HCPCS | Performed by: OBSTETRICS & GYNECOLOGY

## 2017-02-07 PROCEDURE — 700102 HCHG RX REV CODE 250 W/ 637 OVERRIDE(OP): Performed by: OBSTETRICS & GYNECOLOGY

## 2017-02-07 PROCEDURE — G0378 HOSPITAL OBSERVATION PER HR: HCPCS

## 2017-02-07 PROCEDURE — 700112 HCHG RX REV CODE 229: Performed by: OBSTETRICS & GYNECOLOGY

## 2017-02-07 RX ORDER — FUROSEMIDE 10 MG/ML
10 INJECTION INTRAMUSCULAR; INTRAVENOUS ONCE
Status: COMPLETED | OUTPATIENT
Start: 2017-02-07 | End: 2017-02-07

## 2017-02-07 RX ORDER — OXYCODONE AND ACETAMINOPHEN 10; 325 MG/1; MG/1
1 TABLET ORAL EVERY 4 HOURS PRN
Status: DISCONTINUED | OUTPATIENT
Start: 2017-02-07 | End: 2017-02-07 | Stop reason: HOSPADM

## 2017-02-07 RX ORDER — DOCUSATE SODIUM 100 MG/1
100 CAPSULE, LIQUID FILLED ORAL DAILY
Status: DISCONTINUED | OUTPATIENT
Start: 2017-02-07 | End: 2017-02-07 | Stop reason: HOSPADM

## 2017-02-07 RX ADMIN — IBUPROFEN 600 MG: 600 TABLET, FILM COATED ORAL at 06:38

## 2017-02-07 RX ADMIN — FUROSEMIDE 10 MG: 10 INJECTION, SOLUTION INTRAVENOUS at 09:55

## 2017-02-07 RX ADMIN — OXYCODONE HYDROCHLORIDE AND ACETAMINOPHEN 1 TABLET: 10; 325 TABLET ORAL at 09:55

## 2017-02-07 RX ADMIN — OXYCODONE HYDROCHLORIDE AND ACETAMINOPHEN 1 TABLET: 5; 325 TABLET ORAL at 01:36

## 2017-02-07 RX ADMIN — OXYCODONE HYDROCHLORIDE AND ACETAMINOPHEN 1 TABLET: 10; 325 TABLET ORAL at 13:57

## 2017-02-07 RX ADMIN — OXYCODONE HYDROCHLORIDE AND ACETAMINOPHEN 1 TABLET: 5; 325 TABLET ORAL at 05:11

## 2017-02-07 RX ADMIN — DOCUSATE SODIUM 100 MG: 100 CAPSULE ORAL at 09:55

## 2017-02-07 RX ADMIN — IBUPROFEN 600 MG: 600 TABLET, FILM COATED ORAL at 12:38

## 2017-02-07 ASSESSMENT — PAIN SCALES - GENERAL
PAINLEVEL_OUTOF10: 2
PAINLEVEL_OUTOF10: 3
PAINLEVEL_OUTOF10: 6
PAINLEVEL_OUTOF10: 3
PAINLEVEL_OUTOF10: 6
PAINLEVEL_OUTOF10: 5

## 2017-02-07 NOTE — DISCHARGE INSTRUCTIONS
Discharge Instructions    Discharged to home by car with relative. Discharged via wheelchair, hospital escort: Yes.  Special equipment needed: Not Applicable    Be sure to schedule a follow-up appointment with your primary care doctor or any specialists as instructed.     Discharge Plan:   Diet Plan: Discussed  Activity Level: Discussed  Confirmed Follow up Appointment: Appointment Scheduled  Confirmed Symptoms Management: Discussed  Medication Reconciliation Updated: Yes  Influenza Vaccine Indication: Patient Refuses    I understand that a diet low in cholesterol, fat, and sodium is recommended for good health. Unless I have been given specific instructions below for another diet, I accept this instruction as my diet prescription.   Other diet: Regular diet as tolerated    Special Instructions: None    · Is patient discharged on Warfarin / Coumadin?   No     · Is patient Post Blood Transfusion?  No    Depression / Suicide Risk    As you are discharged from this [facility] facility, it is important to learn how to keep safe from harming yourself.    Recognize the warning signs:  · Abrupt changes in personality, positive or negative- including increase in energy   · Giving away possessions  · Change in eating patterns- significant weight changes-  positive or negative  · Change in sleeping patterns- unable to sleep or sleeping all the time   · Unwillingness or inability to communicate  · Depression  · Unusual sadness, discouragement and loneliness  · Talk of wanting to die  · Neglect of personal appearance   · Rebelliousness- reckless behavior  · Withdrawal from people/activities they love  · Confusion- inability to concentrate     If you or a loved one observes any of these behaviors or has concerns about self-harm, here's what you can do:  · Talk about it- your feelings and reasons for harming yourself  · Remove any means that you might use to hurt yourself (examples: pills, rope, extension cords,  firearm)  · Get professional help from the community (Mental Health, Substance Abuse, psychological counseling)  · Do not be alone:Call your Safe Contact- someone whom you trust who will be there for you.  · Call your local CRISIS HOTLINE 998-4904 or 293-628-4850  · Call your local Children's Mobile Crisis Response Team [place] (122) 910-0451 or www.[employer]  · Call the toll free National Suicide Prevention Hotlines   · National Suicide Prevention Lifeline 632-313-KIFJ (2935)  · [employer] Line Network 800-SUICIDE (755-8669)            Hysterectomy Information   A hysterectomy is a surgery in which your uterus is removed. This surgery may be done to treat various medical problems. After the surgery, you will no longer have menstrual periods. The surgery will also make you unable to become pregnant (sterile). The fallopian tubes and ovaries can be removed (bilateral salpingo-oophorectomy) during this surgery as well.   REASONS FOR A HYSTERECTOMY  · Persistent, abnormal bleeding.  · Lasting (chronic) pelvic pain or infection.  · The lining of the uterus (endometrium) starts growing outside the uterus (endometriosis).  · The endometrium starts growing in the muscle of the uterus (adenomyosis).  · The uterus falls down into the vagina (pelvic organ prolapse).  · Noncancerous growths in the uterus (uterine fibroids) that cause symptoms.  · Precancerous cells.  · Cervical cancer or uterine cancer.  TYPES OF HYSTERECTOMIES  2. Supracervical hysterectomy--In this type, the top part of the uterus is removed, but not the cervix.  3. Total hysterectomy--The uterus and cervix are removed.  4. Radical hysterectomy--The uterus, the cervix, and the fibrous tissue that holds the uterus in place in the pelvis (parametrium) are removed.  WAYS A HYSTERECTOMY CAN BE PERFORMED  2. Abdominal hysterectomy--A large surgical cut (incision) is made in the abdomen. The uterus is removed through this incision.  3. Vaginal  hysterectomy--An incision is made in the vagina. The uterus is removed through this incision. There are no abdominal incisions.  4. Conventional laparoscopic hysterectomy--Three or four small incisions are made in the abdomen. A thin, lighted tube with a camera (laparoscope) is inserted into one of the incisions. Other tools are put through the other incisions. The uterus is cut into small pieces. The small pieces are removed through the incisions, or they are removed through the vagina.  5. Laparoscopically assisted vaginal hysterectomy (LAVH)--Three or four small incisions are made in the abdomen. Part of the surgery is performed laparoscopically and part vaginally. The uterus is removed through the vagina.  6. Robot-assisted laparoscopic hysterectomy--A laparoscope and other tools are inserted into 3 or 4 small incisions in the abdomen. A computer-controlled device is used to give the surgeon a 3D image and to help control the surgical instruments. This allows for more precise movements of surgical instruments. The uterus is cut into small pieces and removed through the incisions or removed through the vagina.  RISKS AND COMPLICATIONS   Possible complications associated with this procedure include:  2. Bleeding and risk of blood transfusion. Tell your health care provider if you do not want to receive any blood products.  3. Blood clots in the legs or lung.  4. Infection.  5. Injury to surrounding organs.  6. Problems or side effects related to anesthesia.  7. Conversion to an abdominal hysterectomy from one of the other techniques.  WHAT TO EXPECT AFTER A HYSTERECTOMY  2. You will be given pain medicine.  3. You will need to have someone with you for the first 3-5 days after you go home.  4. You will need to follow up with your surgeon in 2-4 weeks after surgery to evaluate your progress.  5. You may have early menopause symptoms such as hot flashes, night sweats, and insomnia.  6. If you had a hysterectomy for  a problem that was not cancer or not a condition that could lead to cancer, then you no longer need Pap tests. However, even if you no longer need a Pap test, a regular exam is a good idea to make sure no other problems are starting.     This information is not intended to replace advice given to you by your health care provider. Make sure you discuss any questions you have with your health care provider.     Document Released: 06/13/2002 Document Revised: 10/08/2014 Document Reviewed: 08/25/2014  [employer] Interactive Patient Education ©2016 [employer]    ACTIVITY: Rest and take it easy for the first 24 hours.  A responsible adult is recommended to remain with you during that time.  It is normal to feel sleepy.  We encourage you to not do anything that requires balance, judgment or coordination.    MILD FLU-LIKE SYMPTOMS ARE NORMAL. YOU MAY EXPERIENCE GENERALIZED MUSCLE ACHES, THROAT IRRITATION, HEADACHE AND/OR SOME NAUSEA.    FOR 24 HOURS DO NOT:  Drive, operate machinery or run household appliances.  Drink beer or alcoholic beverages.   Make important decisions or sign legal documents.    SPECIAL INSTRUCTIONS:   *Pelvic rest for 6 wks (Nothing vaginally: intercourse, tampons, douching) or until MD gives approval.   *Keep bowels soft and regular: use Milk of Magnesia, OR Colace over the counter twice daily as needed for constipation.      DIET: To avoid nausea, slowly advance diet as tolerated, avoiding spicy or greasy foods for the first day.  Add more substantial food to your diet according to your physician's instructions.  Babies can be fed formula or breast milk as soon as they are hungry.  INCREASE FLUIDS AND FIBER TO AVOID CONSTIPATION.    SURGICAL DRESSING/BATHING: May shower or take baths.    FOLLOW-UP APPOINTMENT:  A follow-up appointment should be arranged with your doctor call to schedule.    You should CALL YOUR PHYSICIAN if you develop:  Fever greater than 101 degrees F.  Pain not relieved by  medication, or persistent nausea or vomiting.  Excessive bleeding (blood soaking through dressing) or unexpected drainage from the wound.  Extreme redness or swelling around the incision site, drainage of pus or foul smelling drainage.  Inability to urinate or empty your bladder within 8 hours.  Problems with breathing or chest pain.    You should call 911 if you develop problems with breathing or chest pain.  If you are unable to contact your doctor or surgical center, you should go to the nearest emergency room or urgent care center.  Physician's telephone #: Dr Dailey  [phone]    If any questions arise, call your doctor.  If your doctor is not available, please feel free to call the Surgical Center at [phone].  The Center is open Monday through Friday from 7AM to 7PM.  You can also call the [employer] HOTLINE open 24 hours/day, 7 days/week and speak to a nurse at [phone], or toll free at [phone].    A registered nurse may call you a few days after your surgery to see how you are doing after your procedure.    MEDICATIONS: Resume taking daily medication.  Take prescribed pain medication with food.  If no medication is prescribed, you may take non-aspirin pain medication if needed.  PAIN MEDICATION CAN BE VERY CONSTIPATING.  Take a stool softener or laxative such as senokot, pericolace, or milk of magnesia if needed.    Prescription given for (patient has at home).  Last pain medication given at 2PM.    If your physician has prescribed pain medication that includes Acetaminophen (Tylenol), do not take additional Acetaminophen (Tylenol) while taking the prescribed medication.    Depression / Suicide Risk    As you are discharged from this [facility] facility, it is important to learn how to keep safe from harming yourself.    Recognize the warning signs:  · Abrupt changes in personality, positive or negative- including increase in energy   · Giving away possessions  · Change in eating patterns-  significant weight changes-  positive or negative  · Change in sleeping patterns- unable to sleep or sleeping all the time   · Unwillingness or inability to communicate  · Depression  · Unusual sadness, discouragement and loneliness  · Talk of wanting to die  · Neglect of personal appearance   · Rebelliousness- reckless behavior  · Withdrawal from people/activities they love  · Confusion- inability to concentrate     If you or a loved one observes any of these behaviors or has concerns about self-harm, here's what you can do:  · Talk about it- your feelings and reasons for harming yourself  · Remove any means that you might use to hurt yourself (examples: pills, rope, extension cords, firearm)  · Get professional help from the community (Mental Health, Substance Abuse, psychological counseling)  · Do not be alone:Call your Safe Contact- someone whom you trust who will be there for you.  · Call your local CRISIS HOTLINE 450-8728 or 715-088-9626  · Call your local Children's Mobile Crisis Response Team [place] (496) 862-9471 or www.[employer]  · Call the toll free National Suicide Prevention Hotlines   · National Suicide Prevention Lifeline 683-620-XTNH (8517)  · National Hope Line Network 800-SUICIDE (938-5775)

## 2017-02-07 NOTE — PROGRESS NOTES
Pt new admit from PACU to S352 on 2135. Scooted self from gurney to bed without any problems. On ra. Denies respiratory distress. Abdomen rounded, distended, soft. Denies n/v. Taking sips of water and ice chips right now. Lap site to mid abdomen with old drainage. Bruising noted, marked for assessment. Pt reports hx of PE. Will place scd's. Reports abdominal pain but manageable right now. Generalized edema. Plan of care reviewed with patient including purdy removal trial in am. Verbalized understanding and agrees. Able to make needs known.

## 2017-02-07 NOTE — PROGRESS NOTES
S: Called by PACU nurse earlier that patient was having trouble keeping her O2 sats up. Dropping down to 83% when asleep. Also, has tachycardia at 120-130s. When got up into sitting position, she had some upper abdominal discomfort. I ordered a stat CXR, EKG, Pulm CT to R/O PE and CBC. These returned as normal. I did give patient 1 dose of lasix (10 mg) and she seems to be doing better now. She received 4 L of fluid during  Surgery and does feel swollen. I came to see the patient in the PACU and she looks good but is still tachy at 113. She does not have much pain and is breathing better. She is on 2 L of O2 and does not have SOB at this time.    O: P=113, R=20, HC=273/89, Afebrile  HEENT NL, Abd: Soft, NT, ND, no masses, Ext: sl swollen, Umbilical incisions is sl bloody, some bruising    A: s/p LRH, bilateral salpingectomy, tachycardic, low sats-better now on 2 L,   R/o for PE -done  CBC normal postop  CXR normal  Suspect third spacing fluids - better after lasix    P: Will admit of OBS since not acting like most of my patients postop  Plan removal of cath with trial of voiding in am  Percocel, ibuprofen for pain  Another dose of lasix tonight  Continue postop orders  May d/c in am if criteris met.

## 2017-02-07 NOTE — OR NURSING
Pt seen by Dr Dailey. Pt will be admitted for observation over night. Verbal order to leave purdy in over night received. Per Dr Dailey, purdy can be DC'd in the am. Pt aware of POC.

## 2017-02-07 NOTE — PROGRESS NOTES
Report received from May RN, assumed pt care.  VSS, assessment complete, AAOx4,  at bedside. Percocet given for pain, all other scheduled medications given as ordered (see MAR). POC is pain control, lasix dose, dc.  Pt has no further needs at this time.  Call light within reach, fall precautions in place.  Will continue to monitor.

## 2017-02-07 NOTE — CARE PLAN
Problem: Venous Thromboembolism (VTW)/Deep Vein Thrombosis (DVT) Prevention:  Goal: Patient will participate in Venous Thrombosis (VTE)/Deep Vein Thrombosis (DVT)Prevention Measures  scd's in place. No s/sx of blood clot. Educated to walk in am when pain is managed.     Problem: Bowel/Gastric:  Goal: Normal bowel function is maintained or improved  Abdomen remains rounded, soft. Pain is better managed now. No gas yet. Hypoactive bs t/o. No n/v.

## 2017-02-07 NOTE — PROGRESS NOTES
Pt discharged home with  today.  Instructions and prescriptions given, all questions answered.  Pt and  verbalize their understanding of all instructions, medications, and follow up care. PIV dc'd.  Pt taken off unit in wheelchair, no changes to pt status.

## 2017-02-07 NOTE — OR NURSING
1930 - Pt medicated for pain, VS remain stable. Gown changed, repositioned for comfort. Continues to wait for bed assignment.

## 2017-08-02 ENCOUNTER — HOSPITAL ENCOUNTER (EMERGENCY)
Facility: MEDICAL CENTER | Age: 37
End: 2017-08-02
Attending: EMERGENCY MEDICINE
Payer: COMMERCIAL

## 2017-08-02 VITALS
HEART RATE: 98 BPM | SYSTOLIC BLOOD PRESSURE: 133 MMHG | RESPIRATION RATE: 16 BRPM | BODY MASS INDEX: 24.88 KG/M2 | HEIGHT: 69 IN | WEIGHT: 168 LBS | DIASTOLIC BLOOD PRESSURE: 81 MMHG | TEMPERATURE: 97.6 F

## 2017-08-02 DIAGNOSIS — R51.9 HEADACHE, UNSPECIFIED HEADACHE TYPE: ICD-10-CM

## 2017-08-02 DIAGNOSIS — E86.0 DEHYDRATION: ICD-10-CM

## 2017-08-02 DIAGNOSIS — T67.5XXA: ICD-10-CM

## 2017-08-02 LAB
ALBUMIN SERPL BCP-MCNC: 4.2 G/DL (ref 3.2–4.9)
ALBUMIN/GLOB SERPL: 1.7 G/DL
ALP SERPL-CCNC: 69 U/L (ref 30–99)
ALT SERPL-CCNC: 10 U/L (ref 2–50)
ANION GAP SERPL CALC-SCNC: 12 MMOL/L (ref 0–11.9)
APPEARANCE UR: CLEAR
AST SERPL-CCNC: 17 U/L (ref 12–45)
BASOPHILS # BLD AUTO: 0.4 % (ref 0–1.8)
BASOPHILS # BLD: 0.04 K/UL (ref 0–0.12)
BILIRUB SERPL-MCNC: 0.8 MG/DL (ref 0.1–1.5)
BILIRUB UR QL STRIP.AUTO: NEGATIVE
BUN SERPL-MCNC: 11 MG/DL (ref 8–22)
CALCIUM SERPL-MCNC: 9.2 MG/DL (ref 8.5–10.5)
CHLORIDE SERPL-SCNC: 103 MMOL/L (ref 96–112)
CK MB SERPL-MCNC: 1.4 NG/ML (ref 0–5)
CO2 SERPL-SCNC: 20 MMOL/L (ref 20–33)
COLOR UR: YELLOW
CREAT SERPL-MCNC: 0.73 MG/DL (ref 0.5–1.4)
CULTURE IF INDICATED INDCX: NO UA CULTURE
EOSINOPHIL # BLD AUTO: 0.2 K/UL (ref 0–0.51)
EOSINOPHIL NFR BLD: 2 % (ref 0–6.9)
ERYTHROCYTE [DISTWIDTH] IN BLOOD BY AUTOMATED COUNT: 41.6 FL (ref 35.9–50)
GFR SERPL CREATININE-BSD FRML MDRD: >60 ML/MIN/1.73 M 2
GLOBULIN SER CALC-MCNC: 2.5 G/DL (ref 1.9–3.5)
GLUCOSE SERPL-MCNC: 101 MG/DL (ref 65–99)
GLUCOSE UR STRIP.AUTO-MCNC: NEGATIVE MG/DL
HCT VFR BLD AUTO: 43.3 % (ref 37–47)
HGB BLD-MCNC: 14.9 G/DL (ref 12–16)
IMM GRANULOCYTES # BLD AUTO: 0.02 K/UL (ref 0–0.11)
IMM GRANULOCYTES NFR BLD AUTO: 0.2 % (ref 0–0.9)
KETONES UR STRIP.AUTO-MCNC: ABNORMAL MG/DL
LEUKOCYTE ESTERASE UR QL STRIP.AUTO: NEGATIVE
LYMPHOCYTES # BLD AUTO: 1.73 K/UL (ref 1–4.8)
LYMPHOCYTES NFR BLD: 17.4 % (ref 22–41)
MCH RBC QN AUTO: 32.5 PG (ref 27–33)
MCHC RBC AUTO-ENTMCNC: 34.4 G/DL (ref 33.6–35)
MCV RBC AUTO: 94.5 FL (ref 81.4–97.8)
MICRO URNS: ABNORMAL
MONOCYTES # BLD AUTO: 0.73 K/UL (ref 0–0.85)
MONOCYTES NFR BLD AUTO: 7.4 % (ref 0–13.4)
NEUTROPHILS # BLD AUTO: 7.21 K/UL (ref 2–7.15)
NEUTROPHILS NFR BLD: 72.6 % (ref 44–72)
NITRITE UR QL STRIP.AUTO: NEGATIVE
NRBC # BLD AUTO: 0 K/UL
NRBC BLD AUTO-RTO: 0 /100 WBC
PH UR STRIP.AUTO: 8 [PH]
PLATELET # BLD AUTO: 257 K/UL (ref 164–446)
PMV BLD AUTO: 10.4 FL (ref 9–12.9)
POTASSIUM SERPL-SCNC: 3.2 MMOL/L (ref 3.6–5.5)
PROT SERPL-MCNC: 6.7 G/DL (ref 6–8.2)
PROT UR QL STRIP: NEGATIVE MG/DL
RBC # BLD AUTO: 4.58 M/UL (ref 4.2–5.4)
RBC UR QL AUTO: NEGATIVE
SODIUM SERPL-SCNC: 135 MMOL/L (ref 135–145)
SP GR UR STRIP.AUTO: 1
WBC # BLD AUTO: 9.9 K/UL (ref 4.8–10.8)

## 2017-08-02 PROCEDURE — 96374 THER/PROPH/DIAG INJ IV PUSH: CPT

## 2017-08-02 PROCEDURE — 96376 TX/PRO/DX INJ SAME DRUG ADON: CPT

## 2017-08-02 PROCEDURE — 81003 URINALYSIS AUTO W/O SCOPE: CPT

## 2017-08-02 PROCEDURE — 700111 HCHG RX REV CODE 636 W/ 250 OVERRIDE (IP): Performed by: EMERGENCY MEDICINE

## 2017-08-02 PROCEDURE — 96361 HYDRATE IV INFUSION ADD-ON: CPT

## 2017-08-02 PROCEDURE — 80053 COMPREHEN METABOLIC PANEL: CPT

## 2017-08-02 PROCEDURE — 85025 COMPLETE CBC W/AUTO DIFF WBC: CPT

## 2017-08-02 PROCEDURE — 99284 EMERGENCY DEPT VISIT MOD MDM: CPT

## 2017-08-02 PROCEDURE — 96375 TX/PRO/DX INJ NEW DRUG ADDON: CPT

## 2017-08-02 PROCEDURE — 700105 HCHG RX REV CODE 258: Performed by: EMERGENCY MEDICINE

## 2017-08-02 PROCEDURE — 82553 CREATINE MB FRACTION: CPT

## 2017-08-02 RX ORDER — ONDANSETRON 2 MG/ML
4 INJECTION INTRAMUSCULAR; INTRAVENOUS ONCE
Status: COMPLETED | OUTPATIENT
Start: 2017-08-02 | End: 2017-08-02

## 2017-08-02 RX ORDER — SODIUM CHLORIDE 9 MG/ML
1000 INJECTION, SOLUTION INTRAVENOUS ONCE
Status: COMPLETED | OUTPATIENT
Start: 2017-08-02 | End: 2017-08-02

## 2017-08-02 RX ORDER — METOCLOPRAMIDE HYDROCHLORIDE 5 MG/ML
10 INJECTION INTRAMUSCULAR; INTRAVENOUS ONCE
Status: COMPLETED | OUTPATIENT
Start: 2017-08-02 | End: 2017-08-02

## 2017-08-02 RX ADMIN — HYDROMORPHONE HYDROCHLORIDE 1 MG: 1 INJECTION, SOLUTION INTRAMUSCULAR; INTRAVENOUS; SUBCUTANEOUS at 15:16

## 2017-08-02 RX ADMIN — METOCLOPRAMIDE 10 MG: 5 INJECTION, SOLUTION INTRAMUSCULAR; INTRAVENOUS at 16:58

## 2017-08-02 RX ADMIN — HYDROMORPHONE HYDROCHLORIDE 1 MG: 1 INJECTION, SOLUTION INTRAMUSCULAR; INTRAVENOUS; SUBCUTANEOUS at 16:58

## 2017-08-02 RX ADMIN — SODIUM CHLORIDE 1000 ML: 9 INJECTION, SOLUTION INTRAVENOUS at 15:16

## 2017-08-02 RX ADMIN — ONDANSETRON 4 MG: 2 INJECTION INTRAMUSCULAR; INTRAVENOUS at 15:16

## 2017-08-02 ASSESSMENT — PAIN SCALES - GENERAL: PAINLEVEL_OUTOF10: 0

## 2017-08-02 NOTE — ED PROVIDER NOTES
ED Provider Note    CHIEF COMPLAINT  Chief Complaint   Patient presents with   • Nausea   • Diarrhea       HPI  Tiana Martinez is a 37 y.o. female who presents to the emergency department arriving by ambulance In by  for evaluation of nausea diarrhea general body aches and headache. Patient notes that she has had difficulty with heat exposure in the past. This morning her long this morning when she became ill. Patient states she uses an ice pack and then later took a cool shower. At this point patient developed migraine-type headache. No significant increased in severity over time and is similar to previous migraines in the past.    Patient denies that she has been to the ER on multiple occasions for migraines. Patient also felt that her hands and feet change color. She describes some minimal difficulty with breathing.    After arrival in the emergency Department patient was placed in a darkened room and states she feels 7% better. Patient notes that she is thirsty. Describes no abdominal pain. No symptoms yesterday.    Review of old chart shows history of depression and anxiety fibroids ureteral disorder and febrile seizures. Recent robotic hysterectomy. No previous ED visits. She arrives in the emergency department with fairly normal vital signs normal temperature somewhat tachycardic at 108.    REVIEW OF SYSTEMS  See HPI for further details. All other systems are negative.     PAST MEDICAL HISTORY  Past Medical History   Diagnosis Date   • Unspecified diffuse connective tissue disease      no issues at this time 1/2017   • Encounter for long-term (current) use of other medications    • Seizure (CMS-[facility]) 2013     Febrile   • Renal disorder      stones   • Pain 1/2017     lower abdomen   • Psychiatric problem      depression/anxiety   • Bronchitis 2009   • Fibroids 2017   • Sepsis (CMS-[facility]) 4/2015     from PICC line   • Blood clotting disorder (CMS-[facility]) 4/2015     right arm from PICC line  "      FAMILY HISTORY  No significant family history    SOCIAL HISTORY  Social History     Social History   • Marital Status:      Spouse Name: N/A   • Number of Children: N/A   • Years of Education: N/A     Social History Main Topics   • Smoking status: Never Smoker    • Smokeless tobacco: None   • Alcohol Use: Yes      Comment: 1-2 per day wine   • Drug Use: No   • Sexual Activity: Not Asked     Other Topics Concern   • None     Social History Narrative       SURGICAL HISTORY  Past Surgical History   Procedure Laterality Date   • Hysterectomy robotic single site  2/6/2017     Procedure: HYSTERECTOMY ROBOTIC SINGLE SITE BILATERAL SALPINGECTOMY ;  Surgeon: Zaynab Dailey M.D.;  Location: SURGERY [facility];  Service:    • Cystoscopy  2/6/2017     Procedure: CYSTOSCOPY;  Surgeon: Zaynab Dailey M.D.;  Location: SURGERY [facility];  Service:        CURRENT MEDICATIONS  Home Medications     Reviewed by Gisele Swann R.N. (Registered Nurse) on 08/02/17 at 1454  Med List Status: Partial    Medication Last Dose Status    acetaminophen (TYLENOL) 500 MG Tab 2/4/2017 Active    duloxetine (CYMBALTA) 60 MG CPEP 8/2/2017 Active                 ALLERGIES  Allergies   Allergen Reactions   • Chloraprep One Step Rash and Itching     Pt gets severe weeping rash where antiseptic was used   • Codeine Vomiting   • Tramadol      \"got dizzy, couldn't move\"   • Turmeric Anaphylaxis   • Mobic Hives   • Gluten Meal      Gluten intolerant   • Lactose      Lactose intolerant   • Other Misc      Adhesives including EKG stickers rash at contact site   • Sulfa Drugs      Per allergy blood test   • Tape Rash     All tapes rash at contact site   • Tree Nuts Food Allergy                PHYSICAL EXAM  VITAL SIGNS: /89 mmHg  Pulse 108  Temp(Src) 36.4 °C (97.6 °F)  Resp 16  Ht 1.753 m (5' 9\")  Wt 76.204 kg (168 lb)  BMI 24.80 kg/m2    Constitutional: Well developed, Well nourished, minimal to moderate acute " distress, Non-toxic appearance. Eyes closed  HENT: Normocephalic, Atraumatic, Bilateral external ears normal, Oropharynx dry with evidence of dehydration, No oral exudates, Nose normal.   Eyes: PERRLA, EOMI, Conjunctiva normal, No discharge.   Neck: Normal range of motion, No tenderness, Supple, No stridor. No masses. No evidence of meningitis or meningismus. Able to touch chin to chest without evidence of meningitis.  Lymphatic: No lymphadenopathy noted.   Cardiovascular: Normal heart rate, Normal rhythm, No murmurs, No rubs, No gallops.   Thorax & Lungs: Normal breath sounds, No respiratory distress, No wheezing or rhonchi, No chest tenderness.   Abdomen: Bowel sounds normal, Soft, No tenderness, No masses, No pulsatile masses. No guarding or rebound. No evidence of peritoneal findings.  Skin: Warm, Dry, No erythema, No rash. No exanthem.   Extremities:  No edema, No tenderness, No cyanosis, No clubbing.   Musculoskeletal: Good range of motion in all major joints. No major deformities noted.   Neurologic: Alert & oriented x 3, Normal motor function, No focal deficits noted.   Psychiatric: Affect normal, mood normal. Patient gives history with eyes closed.                                                    Urologic: No CVA tenderness no evidence of pyelonephritis.                             RADIOLOGY/PROCEDURES      COURSE & MEDICAL DECISION MAKING  Pertinent Labs & Imaging studies reviewed. (See chart for details)  Patient with heat illness type symptoms. Patient infection to chest without difficulty notes sudden onset or worst headache of her life. No evidence of meningitis or meningismus. Patient was bolused with 1 L normal saline. Given Zofran for nausea and Dilaudid for pain.    Laboratories drawn.    Results for orders placed or performed during the hospital encounter of 08/02/17   CBC WITH DIFFERENTIAL   Result Value Ref Range    WBC 9.9 4.8 - 10.8 K/uL    RBC 4.58 4.20 - 5.40 M/uL    Hemoglobin 14.9 12.0 -  16.0 g/dL    Hematocrit 43.3 37.0 - 47.0 %    MCV 94.5 81.4 - 97.8 fL    MCH 32.5 27.0 - 33.0 pg    MCHC 34.4 33.6 - 35.0 g/dL    RDW 41.6 35.9 - 50.0 fL    Platelet Count 257 164 - 446 K/uL    MPV 10.4 9.0 - 12.9 fL    Neutrophils-Polys 72.60 (H) 44.00 - 72.00 %    Lymphocytes 17.40 (L) 22.00 - 41.00 %    Monocytes 7.40 0.00 - 13.40 %    Eosinophils 2.00 0.00 - 6.90 %    Basophils 0.40 0.00 - 1.80 %    Immature Granulocytes 0.20 0.00 - 0.90 %    Nucleated RBC 0.00 /100 WBC    Neutrophils (Absolute) 7.21 (H) 2.00 - 7.15 K/uL    Lymphs (Absolute) 1.73 1.00 - 4.80 K/uL    Monos (Absolute) 0.73 0.00 - 0.85 K/uL    Eos (Absolute) 0.20 0.00 - 0.51 K/uL    Baso (Absolute) 0.04 0.00 - 0.12 K/uL    Immature Granulocytes (abs) 0.02 0.00 - 0.11 K/uL    NRBC (Absolute) 0.00 K/uL   COMP METABOLIC PANEL   Result Value Ref Range    Sodium 135 135 - 145 mmol/L    Potassium 3.2 (L) 3.6 - 5.5 mmol/L    Chloride 103 96 - 112 mmol/L    Co2 20 20 - 33 mmol/L    Anion Gap 12.0 (H) 0.0 - 11.9    Glucose 101 (H) 65 - 99 mg/dL    Bun 11 8 - 22 mg/dL    Creatinine 0.73 0.50 - 1.40 mg/dL    Calcium 9.2 8.5 - 10.5 mg/dL    AST(SGOT) 17 12 - 45 U/L    ALT(SGPT) 10 2 - 50 U/L    Alkaline Phosphatase 69 30 - 99 U/L    Total Bilirubin 0.8 0.1 - 1.5 mg/dL    Albumin 4.2 3.2 - 4.9 g/dL    Total Protein 6.7 6.0 - 8.2 g/dL    Globulin 2.5 1.9 - 3.5 g/dL    A-G Ratio 1.7 g/dL   CKMB   Result Value Ref Range    CK-Mb 1.4 0.0 - 5.0 ng/mL   URINALYSIS CULTURE, IF INDICATED   Result Value Ref Range    Micro Urine Req see below     Color Yellow     Character Clear     Specific Gravity 1.005 <1.035    Ph 8.0 5.0-8.0    Glucose Negative Negative mg/dL    Ketones Trace (A) Negative mg/dL    Protein Negative Negative mg/dL    Bilirubin Negative Negative    Nitrite Negative Negative    Leukocyte Esterase Negative Negative    Occult Blood Negative Negative    Culture Indicated No UA Culture   ESTIMATED GFR   Result Value Ref Range    GFR If  >60  >60 mL/min/1.73 m 2    GFR If Non African American >60 >60 mL/min/1.73 m 2        Reevaluation of patient notes she continues to feel better. Patient noted she felt much better after receiving crystalloid Zofran and Dilaudid.      5:22 PM. Patient notes symptoms have almost completely abated. No headache. Patient able tolerate fluids. Patient safe for outpatient management. Instruction sheet on heat illness. Instruction sheet on dehydration and rehydration. Patient's return if return of symptoms. Patient discharged with Vicodin and Zofran.    FINAL IMPRESSION  1. Heat exhaustion, unspecified    2. Dehydration    3. Headache, unspecified headache type               Electronically signed by: Andre Odom, 8/2/2017 3:04 PM

## 2017-08-02 NOTE — ED AVS SNAPSHOT
8/2/2017    Tiana Martinez  [address] [place] [address]    Dear Tiana:    [facility] wants to ensure your discharge home is safe and you or your loved ones have had all of your questions answered regarding your care after you leave the hospital.    Below is a list of resources and contact information should you have any questions regarding your hospital stay, follow-up instructions, or active medical symptoms.    Questions or Concerns Regarding… Contact   Medical Questions Related to Your Discharge  (7 days a week, 8am-5pm) Contact a Nurse Care Coordinator   [phone]   Medical Questions Not Related to Your Discharge  (24 hours a day / 7 days a week)  Contact the Nurse Health Line   [phone]    Medications or Discharge Instructions Refer to your discharge packet   or contact your [facility] Primary Care Provider   [phone]   Follow-up Appointment(s) Schedule your appointment via [facility]   or contact Scheduling [phone]   Billing Review your statement via [facility]  or contact Billing [phone]   Medical Records Review your records via [facility]   or contact Medical Records [phone]     You may receive a telephone call within two days of discharge. This call is to make certain you understand your discharge instructions and have the opportunity to have any questions answered. You can also easily access your medical information, test results and upcoming appointments via the [facility] free online health management tool. You can learn more and sign up at [employer]. For assistance setting up your [facility] account, please call [phone].    Once again, we want to ensure your discharge home is safe and that you have a clear understanding of any next steps in your care. If you have any questions or concerns, please do not hesitate to contact us, we are here for you. Thank you for choosing [facility] for your healthcare needs.    Sincerely,    Your [facility] Healthcare Team

## 2017-08-02 NOTE — ED AVS SNAPSHOT
[facility] Access Code: Activation code not generated  Current [facility] Status: Active    [employer]  A secure, online tool to manage your health information     [employer]’s [facility]® is a secure, online tool that connects you to your personalized health information from the privacy of your home -- day or night - making it very easy for you to manage your healthcare. Once the activation process is completed, you can even access your medical information using the [facility] christie, which is available for free in the [employer] Christie store or Google Play store.     [facility] provides the following levels of access (as shown below):   My Chart Features   [facility] Primary Care Doctor [facility]  Specialists [facility]  Urgent  Care Non-[facility]  Primary Care  Doctor   Email your healthcare team securely and privately 24/7 X X X X   Manage appointments: schedule your next appointment; view details of past/upcoming appointments X      Request prescription refills. X      View recent personal medical records, including lab and immunizations X X X X   View health record, including health history, allergies, medications X X X X   Read reports about your outpatient visits, procedures, consult and ER notes X X X X   See your discharge summary, which is a recap of your hospital and/or ER visit that includes your diagnosis, lab results, and care plan. X X       How to register for [facility]:  1. Go to  [email or web address].  2. Click on the Sign Up Now box, which takes you to the New Member Sign Up page. You will need to provide the following information:  a. Enter your [facility] Access Code exactly as it appears at the top of this page. (You will not need to use this code after you’ve completed the sign-up process. If you do not sign up before the expiration date, you must request a new code.)   b. Enter your date of birth.   c. Enter your home email address.   d. Click Submit, and follow the next screen’s instructions.  3. Create a [facility] ID. This will  be your [employer] login ID and cannot be changed, so think of one that is secure and easy to remember.  4. Create a [facility] password. You can change your password at any time.  5. Enter your Password Reset Question and Answer. This can be used at a later time if you forget your password.   6. Enter your e-mail address. This allows you to receive e-mail notifications when new information is available in [facility].  7. Click Sign Up. You can now view your health information.    For assistance activating your [employer] account, call [phone]

## 2017-08-02 NOTE — ED NOTES
BIB [facility] to rm [facility], pt is coming from home, c/o of nausea, diarrhea, chest tightness, and generally not feeling well.  Pt was out in the sun this morning for about an hour and a half, and is very sensitive to the heat, pt said she drank ice water and had ice to her head, but does not feel any better.  Denies any abd pain, felt fine yesterday,  Chart up for ERP.

## 2017-08-02 NOTE — ED AVS SNAPSHOT
Home Care Instructions                                                                                                                Tiana Martinez   MRN: [id number]    Department:  [facility], Emergency Dept   Date of Visit:  8/2/2017            [facility], Emergency Dept    [address]    [place] [address]    Phone:  [phone]      You were seen by     Andre Odom M.D.      Your Diagnosis Was     Heat exhaustion, unspecified     T67.5XXA       These are the medications you received during your hospitalization from 08/02/2017 1429 to 08/02/2017 1810     Date/Time Order Dose Route Action    08/02/2017 1516 NS infusion 1,000 mL 1,000 mL Intravenous New Bag    08/02/2017 1516 HYDROmorphone (DILAUDID) injection 1 mg 1 mg Intravenous Given    08/02/2017 1516 ondansetron (ZOFRAN) syringe/vial injection 4 mg 4 mg Intravenous Given    08/02/2017 1658 HYDROmorphone (DILAUDID) injection 1 mg 1 mg Intravenous Given    08/02/2017 1658 metoclopramide (REGLAN) injection 10 mg 10 mg Intravenous Given      Medication Information     Review all of your home medications and newly ordered medications with your primary doctor and/or pharmacist as soon as possible. Follow medication instructions as directed by your doctor and/or pharmacist.     Please keep your complete medication list with you and share with your physician. Update the information when medications are discontinued, doses are changed, or new medications (including over-the-counter products) are added; and carry medication information at all times in the event of emergency situations.               Medication List      ASK your doctor about these medications        Instructions    Morning Afternoon Evening Bedtime    acetaminophen 500 MG Tabs   Commonly known as:  TYLENOL        Take 1,000 mg by mouth 2 times a day as needed.   Dose:  1000 mg                        CYMBALTA 60 MG Cpep delayed-release  capsule   Generic drug:  duloxetine        Take 60 mg by mouth every morning.   Dose:  60 mg                                Procedures and tests performed during your visit     CBC WITH DIFFERENTIAL    CKMB    COMP METABOLIC PANEL    ESTIMATED GFR    IV Saline Lock    URINALYSIS CULTURE, IF INDICATED        Discharge Instructions       Dehydration, Adult  Dehydration is when you lose more fluids from the body than you take in. Vital organs like the kidneys, brain, and heart cannot function without a proper amount of fluids and salt. Any loss of fluids from the body can cause dehydration.   CAUSES   · Vomiting.  · Diarrhea.  · Excessive sweating.  · Excessive urine output.  · Fever.  SYMPTOMS   Mild dehydration  · Thirst.  · Dry lips.  · Slightly dry mouth.  Moderate dehydration  · Very dry mouth.  · Sunken eyes.  · Skin does not bounce back quickly when lightly pinched and released.  · Dark urine and decreased urine production.  · Decreased tear production.  · Headache.  Severe dehydration  · Very dry mouth.  · Extreme thirst.  · Rapid, weak pulse (more than 100 beats per minute at rest).  · Cold hands and feet.  · Not able to sweat in spite of heat and temperature.  · Rapid breathing.  · Blue lips.  · Confusion and lethargy.  · Difficulty being awakened.  · Minimal urine production.  · No tears.  DIAGNOSIS   Your caregiver will diagnose dehydration based on your symptoms and your exam. Blood and urine tests will help confirm the diagnosis. The diagnostic evaluation should also identify the cause of dehydration.  TREATMENT   Treatment of mild or moderate dehydration can often be done at home by increasing the amount of fluids that you drink. It is best to drink small amounts of fluid more often. Drinking too much at one time can make vomiting worse. Refer to the home care instructions below.  Severe dehydration needs to be treated at the hospital where you will probably be given intravenous (IV) fluids that contain  water and electrolytes.  HOME CARE INSTRUCTIONS   · Ask your caregiver about specific rehydration instructions.  · Drink enough fluids to keep your urine clear or pale yellow.  · Drink small amounts frequently if you have nausea and vomiting.  · Eat as you normally do.  · Avoid:  · Foods or drinks high in sugar.  · Carbonated drinks.  · Juice.  · Extremely hot or cold fluids.  · Drinks with caffeine.  · Fatty, greasy foods.  · Alcohol.  · Tobacco.  · Overeating.  · Gelatin desserts.  · Wash your hands well to avoid spreading bacteria and viruses.  · Only take over-the-counter or prescription medicines for pain, discomfort, or fever as directed by your caregiver.  · Ask your caregiver if you should continue all prescribed and over-the-counter medicines.  · Keep all follow-up appointments with your caregiver.  SEEK MEDICAL CARE IF:  · You have abdominal pain and it increases or stays in one area (localizes).  · You have a rash, stiff neck, or severe headache.  · You are irritable, sleepy, or difficult to awaken.  · You are weak, dizzy, or extremely thirsty.  SEEK IMMEDIATE MEDICAL CARE IF:   · You are unable to keep fluids down or you get worse despite treatment.  · You have frequent episodes of vomiting or diarrhea.  · You have blood or green matter (bile) in your vomit.  · You have blood in your stool or your stool looks black and tarry.  · You have not urinated in 6 to 8 hours, or you have only urinated a small amount of very dark urine.  · You have a fever.  · You faint.  MAKE SURE YOU:   · Understand these instructions.  · Will watch your condition.  · Will get help right away if you are not doing well or get worse.     This information is not intended to replace advice given to you by your health care provider. Make sure you discuss any questions you have with your health care provider.     Document Released: 12/18/2006 Document Revised: 03/11/2013 Document Reviewed: 08/06/2012  [employer] Patient  Education ©2016 [employer]    Heat Disorders  Heat related disorders are illnesses caused by continued exposure to hot and humid environments, not drinking enough fluids, and/or your body failing to regulate its temperature correctly. People suffer from heat stress and heat related disorders when their bodies are unable to compensate and cool down through sweating. With sufficient heat, sweating is not enough to keep you cool, and your body temperature can rise quickly. Very high body temperatures can damage your brain and other vital organs. High humidity (moisture in the air), adds to heat stress, because it is harder for sweat to evaporate and cool your body. Heat stress and disorders are not uncommon. Some medicines can increase your risk for heat related illness. Ask your caregiver about your medicines during periods of intense heat.   Heat related disorders include:  · Heatstroke. When you cannot sweat or regulate your body temperature in an adequate way. This is very dangerous and can be life threatening. Get emergency medical help.  · Heat exhaustion. Overheating causes heavy sweating and a fast heart rate. Your body can still regulate its own temperature.  · Heat cramps. Painful, uncontrollable muscle spasms. Can occur during heavy exercise in hot environments.  · Sunburn. Skin becomes red and painful (burned) after being out in the sun.  · Heat rash. Sweat ducts become blocked, which traps sweat under the skin. This causes blisters and red bumps and may cause an itchy or tingling feeling.  PREVENTING HEAT STRESS AND HEAT RELATED DISORDERS  Overheating can be dangerous and life threatening. When exercising, working, or doing other activities in hot and humid environments, do the following:  · Stay informed by listening to and watching local broadcast weather and safety updates during intense heat.  · Air conditioning is the best way to prevent heat disorders. If your home is not air conditioned, spend  "time in air conditioned places (malls, public libraries, or heat shelters set up by your local health department).  · Wear light-weight, light colored, loose fitting clothing. Wear as little clothing as possible when at home.  · Increase your fluid intake. Drink enough water and fluids to keep your urine clear or pale yellow. DO NOT WAIT UNTIL YOU ARE THIRSTY TO DRINK. You may already be heat stressed, and not recognize it.  · If your caregiver has suggested that you limit the amount of fluid you drink or has prescribed water pills for a medical problem, ask how much you should drink when the weather is hot.  · Do not drink liquids with alcohol, caffeine, or lots of sugar. They can cause more loss of body fluid.  · Heavy sweating drains your body's salt and minerals, which must be replaced. If you must exercise in the heat, a sports beverage can replace the salt and minerals you lose in sweat. If you are on a low-salt diet, check with your caregiver before drinking a sports beverage.  · Sunburn reduces your body's ability to cool itself and causes a loss of needed body fluids. If you go outdoors, protect yourself from the sun by wearing a wide-brimmed hat, along with sunglasses.  · Put on sunscreen of SPF 15 or higher, 30 minutes before going out. (The most effective products say \"broad spectrum\" or \"UVA/UVB protection\" on the label.) Reapply sunscreen frequently -- at least every 1-2 hours.  · Take added precautions when both the heat and humidity are high.  · Rest often.  · Even young and otherwise healthy people can become heat stressed and suffer from a heat disorder, if they participate in strenuous activities during hot weather.  · If you must be outdoors, try going out only during morning and evening hours, when it is cooler. Rest often in shady areas, so that your body's temperature can adjust.  · If your heart pounds or you are gasping for breath, STOP all activity. Go immediately to a cool area, or at " least into the shade, and rest. This is especially true if you become lightheaded, confused, weak, or faint.  · Electric fans may make you comfortable, but they DO NOT prevent heat related problems.  SYMPTOMS   · Headache.  · Nosebleed.  · Weakness.  · You feel very hot.  · Muscle cramps.  · Restlessness.  · Fainting or dizziness.  · Fast breathing and shortness of breath.  · Excessive sweating. (There may be little or no sweating in late stages of heat exhaustion.)  · Rapid pulse, heart pounding.  · Feeling sick to your stomach (nauseous, vomiting).  · Skin becoming cold and clammy, or excessively hot and dry.  HOME CARE INSTRUCTIONS   · Lie down and rest in a cool or air conditioned area.  · Drink enough water and fluids to keep your urine clear or pale yellow. Avoid fluids with caffeine or high sugar content. Avoid coffee, tea, alcohol or stimulants.  · Do not take salt tablets, unless advised by your caregiver.  · Avoid hot foods and heavy meals.  · Bathe or shower in cool water.  · Wear minimal clothing.  · Use a fan. Add cool or warm mist to the air, if possible.  · If possible, decrease the use of your stove or oven at home.  · Monitor adults at risk at least twice a day, watching closely for signs of heat exhaustion or heat stroke. Infants and young children also require more frequent watching.  · Never leave infants, children or pets in a parked car, even if the windows are cracked open.  · If you are 65 years of age or older, have a friend or relative call to check on you twice a day during a heat wave. If you know someone in this age group, check on them at least twice a day.  SEEK IMMEDIATE MEDICAL CARE IF:  · You have a hard time breathing.  · You vomit or pass blood in your stool.  · You have a seizure, feel dizzy or faint, or pass out.  · You develop severe sweating.  · Your skin is red, hot and dry (there is no sweating).  · Your urine turns a dark color or has blood in it.  · You are making very  little or no urine.  · You are unable to keep fluids down.  · You develop chest or abdominal pain.  · You develop a throbbing headache.  · You develop nausea or confusion.  IF YOU OBSERVE SOMEONE WHO MIGHT HAVE HEAT STROKE  This can be life threatening. Call your local emergency services (911 in the [place]  · If the victim is in the sun, get him or her to a shady area.  · Cool the victim rapidly, using whatever methods you have:  · Place the victim in a tub of cool water or a cool shower.  · Spray the victim with cool water from a garden hose, or sponge the person with cool water.  · Wrap the victim in a cool, wet sheet and fan them.  · If emergency medical help is delayed, call the hospital emergency room or your local emergency services (911 in the [place] for further instructions.  · Sometimes a victim's muscles will begin to twitch from heat stroke. If this happens, keep the victim from injuring himself. However, do not place any object in the mouth. Give fluids, unless the muscle twitching makes it difficult or unsafe to do so. If there is vomiting, make sure the airway remains open by turning the victim on his or her side.  Document Released: 12/15/2001 Document Revised: 03/11/2013 Document Reviewed: 10/04/2010  [employer]® Patient Information ©2014 [email or web address].    Heat Exhaustion Information  WHAT IS HEAT EXHAUSTION?  Heat exhaustion happens when your body gets overheated from hot weather or from exercise. Heat exhaustion makes the temperature of your skin and body go up.  Your body cools itself by sweating. If you do not drink enough water to replace what you sweat, you lose too much water and salt. This makes it harder for your body to produce more sweat. When you do not sweat enough, your body cannot cool down, and heat exhaustion may result.  Heat exhaustion can lead to heatstroke, which is a more serious illness.  WHO IS AT RISK FOR HEAT EXHAUSTION?  Anyone can get heat exhaustion. However, heat exhaustion  is more likely when you are exercising or doing a physical activity. It is also more likely when you are in hot and humid weather or bright sunshine.  Heat exhaustion is also more likely to develop in:  · People who are age 65 or older.  · Children.  · People who have a medical condition such as heart disease, poor circulation, sickle cell disease, or high blood pressure.  · People who have a fever.  · People who are very overweight (obese).  · People who are dehydrated from:  1. Drinking alcohol or caffeine.  2. Taking certain medicines, such as diuretics or stimulants.  WHAT ARE THE SYMPTOMS OF HEAT EXHAUSTION?  Symptoms of heat exhaustion include:  · A body temperature of up to 104°F (40°C).  · Moist, cool, and clammy skin.  · Dizziness.  · Headache.  · Nausea.  · Fatigue.  · Thirst.  · Dark-colored urine.  · Rapid pulse or heartbeat.  · Weakness.  · Muscle cramps.  · Confusion.  · Fainting.  WHAT SHOULD I DO IF I THINK I HAVE HEAT EXHAUSTION?  If you think that you have heat exhaustion, call your health care provider. Follow his or her instructions. You should also:  · Call a friend or a family member and ask someone to stay with you.  · Move to a cooler location, such as:  1. Into the shade.  2. In front of a fan.  3. Someplace that has air conditioning.  · Lie down and rest.  · Slowly drink nonalcoholic, noncaffeinated fluids.  · Take off any extra clothing or tight-fitting clothes.  · Take a cool bath or shower, if possible. If you do not have access to a bath or shower, dab or mist cool water on your skin.  WHY IS IT IMPORTANT TO TREAT HEAT EXHAUSTION?  It is extremely important to take care of yourself and treat heat exhaustion as soon as possible. Untreated heat exhaustion can turn into heatstroke. Symptoms of heatstroke include:  · A body temperature of 104°F (40°C) or higher.  · Hot, red skin that may be dry or moist.  · Severe headache.  · Nausea and vomiting.  · Muscle weakness and  cramping.  · Confusion.  · Rapid breathing.  · Fainting.  · Seizure.  These symptoms may represent a serious problem that is an emergency. Do not wait to see if the symptoms will go away. Get medical help right away. Call your local emergency services (911 in the [place] Do not drive yourself to the hospital.   Heatstroke is a life-threatening condition that requires urgent medical treatment. Do not treat heatstroke at home. Heatstroke should be treated by a health care professional and may require hospitalization. At the hospital, you may need to receive fluids through an IV tube:  · If you cannot drink any fluids.  · If you vomit any fluids that you drink.  · If your symptoms do not get better after one hour.  · If your symptoms get worse after one hour.  HOW CAN I PREVENT HEAT EXHAUSTION?  · Avoid outdoor activities on very hot or humid days.  · Do not exercise or do other physical activity when you are not feeling well.  · Drink plenty of nonalcoholic and caffeine-free fluids before and during physical activity.  · Take frequent breaks for rest during physical activity.  · Wear light-colored, loose-fitting, and lightweight clothing in the heat.  · Wear a hat and use sunscreen when exercising outdoors.  · Avoid being outside during the hottest times of the day.  · Check with your health care provider before you start any new activity, especially if you take medicine or have a medical condition.  · Start any new activity slowly and work up to your fitness level.  HOW CAN I HELP TO PROTECT ELDERLY RELATIVES AND NEIGHBORS FROM HEAT EXHAUSTION?  People who are age 65 or older are at greater risk for heat exhaustion. Their bodies have a harder time adjusting to heat. They are also more likely to have a medical condition or be on medicines that increase their risk for heat exhaustion. They may get heat exhaustion indoors if the heat is high for several days. You can help to protect them during hot weather  by:  · Checking on them two or more times each day.  · Making sure that they are drinking plenty of cool, nonalcoholic, and caffeine-free fluids.  · Making sure that they use their air conditioner.  · Taking them to a location where air conditioning is available.  · Talking with their health care provider about their medical needs, medicines, and fluid requirements.     This information is not intended to replace advice given to you by your health care provider. Make sure you discuss any questions you have with your health care provider.     Document Released: 09/26/2009 Document Revised: 05/03/2016 Document Reviewed: 11/25/2015  [employer] Interactive Patient Education ©2016 [employer].    Rehydration, Adult  Rehydration is the replacement of body fluids lost during dehydration. Dehydration is an extreme loss of body fluids to the point of body function impairment. There are many ways extreme fluid loss can occur, including vomiting, diarrhea, or excess sweating. Recovering from dehydration requires replacing lost fluids, continuing to eat to maintain strength, and avoiding foods and beverages that may contribute to further fluid loss or may increase nausea.  HOW TO REHYDRATE  In most cases, rehydration involves the replacement of not only fluids but also carbohydrates and basic body salts. Rehydration with an oral rehydration solution is one way to replace essential nutrients lost through dehydration.  An oral rehydration solution can be purchased at pharmacies, retail stores, and online. Premixed packets of powder that you combine with water to make a solution are also sold. You can prepare an oral rehydration solution at home by mixing the following ingredients together:   ·  - tsp table salt.  · ¾ tsp baking soda.  ·  tsp salt substitute containing potassium chloride.  · 1 tablespoons sugar.  · 1 L (34 oz) of water.  Be sure to use exact measurements. Including too much sugar can make diarrhea worse.  Drink ½-1  cup (120-240 mL) of oral rehydration solution each time you have diarrhea or vomit. If drinking this amount makes your vomiting worse, try drinking smaller amounts more often. For example, drink 1-3 tsp every 5-10 minutes.   A general rule for staying hydrated is to drink 1½-2 L of fluid per day. Talk to your caregiver about the specific amount you should be drinking each day. Drink enough fluids to keep your urine clear or pale yellow.  EATING WHEN DEHYDRATED  Even if you have had severe sweating or you are having diarrhea, do not stop eating. Many healthy items in a normal diet are okay to continue eating while recovering from dehydration. The following tips can help you to lessen nausea when you eat:  · Ask someone else to prepare your food. Cooking smells may worsen nausea.  · Eat in a well-ventilated room away from cooking smells.  · Sit up when you eat. Avoid lying down until 1-2 hours after eating.  · Eat small amounts when you eat.  · Eat foods that are easy to digest. These include soft, well-cooked, or mashed foods.  FOODS AND BEVERAGES TO AVOID  Avoid eating or drinking the following foods and beverages that may increase nausea or further loss of fluid:   · Fruit juices with a high sugar content, such as concentrated juices.  · Alcohol.  · Beverages containing caffeine.  · Carbonated drinks. They may cause a lot of gas.  · Foods that may cause a lot of gas, such as cabbage, broccoli, and beans.  · Fatty, greasy, and fried foods.  · Spicy, very salty, and very sweet foods or drinks.  · Foods or drinks that are very hot or very cold. Consume food or drinks at or near room temperature.  · Foods that need a lot of chewing, such as raw vegetables.  · Foods that are sticky or hard to swallow, such as peanut butter.     This information is not intended to replace advice given to you by your health care provider. Make sure you discuss any questions you have with your health care provider.     Document Released:  "03/11/2013 Document Revised: 09/11/2013 Document Reviewed: 03/11/2013  [employer] Interactive Patient Education ©2016 [employer]    Headaches, Frequently Asked Questions  MIGRAINE HEADACHES  Q: What is migraine? What causes it? How can I treat it?  A: Generally, migraine headaches begin as a dull ache. Then they develop into a constant, throbbing, and pulsating pain. You may experience pain at the temples. You may experience pain at the front or back of one or both sides of the head. The pain is usually accompanied by a combination of:  · Nausea.   · Vomiting.   · Sensitivity to light and noise.   Some people (about 15%) experience an aura (see below) before an attack. The cause of migraine is believed to be chemical reactions in the brain. Treatment for migraine may include over-the-counter or prescription medications. It may also include self-help techniques. These include relaxation training and biofeedback.   Q: What is an aura?  A: About 15% of people with migraine get an \"aura\". This is a sign of neurological symptoms that occur before a migraine headache. You may see wavy or jagged lines, dots, or flashing lights. You might experience tunnel vision or blind spots in one or both eyes. The aura can include visual or auditory hallucinations (something imagined). It may include disruptions in smell (such as strange odors), taste or touch. Other symptoms include:  · Numbness.   · A \"pins and needles\" sensation.   · Difficulty in recalling or speaking the correct word.   These neurological events may last as long as 60 minutes. These symptoms will fade as the headache begins.  Q: What is a trigger?  A: Certain physical or environmental factors can lead to or \"trigger\" a migraine. These include:  · Foods.   · Hormonal changes.   · Weather.   · Stress.   It is important to remember that triggers are different for everyone. To help prevent migraine attacks, you need to figure out which triggers affect you. Keep a " "headache diary. This is a good way to track triggers. The diary will help you talk to your healthcare professional about your condition.  Q: Does weather affect migraines?  A: Bright sunshine, hot, humid conditions, and drastic changes in barometric pressure may lead to, or \"trigger,\" a migraine attack in some people. But studies have shown that weather does not act as a trigger for everyone with migraines.  Q: What is the link between migraine and hormones?  A: Hormones start and regulate many of your body's functions. Hormones keep your body in balance within a constantly changing environment. The levels of hormones in your body are unbalanced at times. Examples are during menstruation, pregnancy, or menopause. That can lead to a migraine attack. In fact, about three quarters of all women with migraine report that their attacks are related to the menstrual cycle.   Q: Is there an increased risk of stroke for migraine sufferers?  A: The likelihood of a migraine attack causing a stroke is very remote. That is not to say that migraine sufferers cannot have a stroke associated with their migraines. In persons under age 40, the most common associated factor for stroke is migraine headache. But over the course of a person's normal life span, the occurrence of migraine headache may actually be associated with a reduced risk of dying from cerebrovascular disease due to stroke.   Q: What are acute medications for migraine?  A: Acute medications are used to treat the pain of the headache after it has started. Examples over-the-counter medications, NSAIDs, ergots, and triptans.   Q: What are the triptans?  A: Triptans are the newest class of abortive medications. They are specifically targeted to treat migraine. Triptans are vasoconstrictors. They moderate some chemical reactions in the brain. The triptans work on receptors in your brain. Triptans help to restore the balance of a neurotransmitter called serotonin. " "Fluctuations in levels of serotonin are thought to be a main cause of migraine.   Q: Are over-the-counter medications for migraine effective?  A: Over-the-counter, or \"OTC,\" medications may be effective in relieving mild to moderate pain and associated symptoms of migraine. But you should see your caregiver before beginning any treatment regimen for migraine.   Q: What are preventive medications for migraine?  A: Preventive medications for migraine are sometimes referred to as \"prophylactic\" treatments. They are used to reduce the frequency, severity, and length of migraine attacks. Examples of preventive medications include antiepileptic medications, antidepressants, beta-blockers, calcium channel blockers, and NSAIDs (nonsteroidal anti-inflammatory drugs).  Q: Why are anticonvulsants used to treat migraine?  A: During the past few years, there has been an increased interest in antiepileptic drugs for the prevention of migraine. They are sometimes referred to as \"anticonvulsants\". Both epilepsy and migraine may be caused by similar reactions in the brain.   Q: Why are antidepressants used to treat migraine?  A: Antidepressants are typically used to treat people with depression. They may reduce migraine frequency by regulating chemical levels, such as serotonin, in the brain.   Q: What alternative therapies are used to treat migraine?  A: The term \"alternative therapies\" is often used to describe treatments considered outside the scope of conventional Western medicine. Examples of alternative therapy include acupuncture, acupressure, and yoga. Another common alternative treatment is herbal therapy. Some herbs are believed to relieve headache pain. Always discuss alternative therapies with your caregiver before proceeding. Some herbal products contain arsenic and other toxins.  TENSION HEADACHES  Q: What is a tension-type headache? What causes it? How can I treat it?  A: Tension-type headaches occur randomly. They " "are often the result of temporary stress, anxiety, fatigue, or anger. Symptoms include soreness in your temples, a tightening band-like sensation around your head (a \"vice-like\" ache). Symptoms can also include a pulling feeling, pressure sensations, and pina head and neck muscles. The headache begins in your forehead, temples, or the back of your head and neck. Treatment for tension-type headache may include over-the-counter or prescription medications. Treatment may also include self-help techniques such as relaxation training and biofeedback.  CLUSTER HEADACHES  Q: What is a cluster headache? What causes it? How can I treat it?  A: Cluster headache gets its name because the attacks come in groups. The pain arrives with little, if any, warning. It is usually on one side of the head. A tearing or bloodshot eye and a runny nose on the same side of the headache may also accompany the pain. Cluster headaches are believed to be caused by chemical reactions in the brain. They have been described as the most severe and intense of any headache type. Treatment for cluster headache includes prescription medication and oxygen.  SINUS HEADACHES  Q: What is a sinus headache? What causes it? How can I treat it?  A: When a cavity in the bones of the face and skull (a sinus) becomes inflamed, the inflammation will cause localized pain. This condition is usually the result of an allergic reaction, a tumor, or an infection. If your headache is caused by a sinus blockage, such as an infection, you will probably have a fever. An x-ray will confirm a sinus blockage. Your caregiver's treatment might include antibiotics for the infection, as well as antihistamines or decongestants.   REBOUND HEADACHES  Q: What is a rebound headache? What causes it? How can I treat it?  A: A pattern of taking acute headache medications too often can lead to a condition known as \"rebound headache.\" A pattern of taking too much headache medication " includes taking it more than 2 days per week or in excessive amounts. That means more than the label or a caregiver advises. With rebound headaches, your medications not only stop relieving pain, they actually begin to cause headaches. Doctors treat rebound headache by tapering the medication that is being overused. Sometimes your caregiver will gradually substitute a different type of treatment or medication. Stopping may be a challenge. Regularly overusing a medication increases the potential for serious side effects. Consult a caregiver if you regularly use headache medications more than 2 days per week or more than the label advises.  ADDITIONAL QUESTIONS AND ANSWERS  Q: What is biofeedback?  A: Biofeedback is a self-help treatment. Biofeedback uses special equipment to monitor your body's involuntary physical responses. Biofeedback monitors:  · Breathing.   · Pulse.   · Heart rate.   · Temperature.   · Muscle tension.   · Brain activity.   Biofeedback helps you refine and perfect your relaxation exercises. You learn to control the physical responses that are related to stress. Once the technique has been mastered, you do not need the equipment any more.  Q: Are headaches hereditary?  A: Four out of five (80%) of people that suffer report a family history of migraine. Scientists are not sure if this is genetic or a family predisposition. Despite the uncertainty, a child has a 50% chance of having migraine if one parent suffers. The child has a 75% chance if both parents suffer.   Q: Can children get headaches?  A: By the time they reach high school, most young people have experienced some type of headache. Many safe and effective approaches or medications can prevent a headache from occurring or stop it after it has begun.   Q: What type of doctor should I see to diagnose and treat my headache?  A: Start with your primary caregiver. Discuss his or her experience and approach to headaches. Discuss methods of  classification, diagnosis, and treatment. Your caregiver may decide to recommend you to a headache specialist, depending upon your symptoms or other physical conditions. Having diabetes, allergies, etc., may require a more comprehensive and inclusive approach to your headache. The [employer] will provide, upon request, a list of NHF physician members in your state.  Document Released: 03/09/2005 Document Revised: 03/11/2013 Document Reviewed: 08/17/2009  [employer]® Patient Information ©2013 [employer].          Patient Information     Patient Information    Following emergency treatment: all patient requiring follow-up care must return either to a private physician or a clinic if your condition worsens before you are able to obtain further medical attention, please return to the emergency room.     Billing Information    At [facility], we work to make the billing process streamlined for our patients.  Our Representatives are here to answer any questions you may have regarding your hospital bill.  If you have insurance coverage and have supplied your insurance information to us, we will submit a claim to your insurer on your behalf.  Should you have any questions regarding your bill, we can be reached online or by phone as follows:  Online: You are able pay your bills online or live chat with our representatives about any billing questions you may have. We are here to help Monday - Friday from 8:00am to 7:30pm and 9:00am - 12:00pm on Saturdays.  Please visit [email or web address]  for more information.   Phone:  [phone] or [phone]    Please note that your emergency physician, surgeon, pathologist, radiologist, anesthesiologist, and other specialists are not employed by [facility] and will therefore bill separately for their services.  Please contact them directly for any questions concerning their bills at the numbers below:     Emergency Physician  Services:  [phone]  [facility]:  [phone]  [facility]:  [phone]  [facility]:  [phone]    1. Your final bill may vary from the amount quoted upon discharge if all procedures are not complete at that time, or if your doctor has additional procedures of which we are not aware. You will receive an additional bill if you return to the Emergency Department at [facility] for suture removal regardless of the facility of which the sutures were placed.     2. Please arrange for settlement of this account at the emergency registration.    3. All self-pay accounts are due in full at the time of treatment.  If you are unable to meet this obligation then payment is expected within 4-5 days.     4. If you have had radiology studies (CT, X-ray, Ultrasound, MRI), you have received a preliminary result during your emergency department visit. Please contact the radiology department [phone] to receive a copy of your final result. Please discuss the Final result with your primary physician or with the follow up physician provided.     Crisis Hotline:  [place] Crisis Hotline:  6-356-ZROXXFU or 1-104.301.3870  [place] Crisis Hotline:    1-260.698.8031 or 227-464-7315         ED Discharge Follow Up Questions    1. In order to provide you with very good care, we would like to follow up with a phone call in the next few days.  May we have your permission to contact you?     YES /  NO    2. What is the best phone number to call you? (       )_____-__________    3. What is the best time to call you?      Morning  /  Afternoon  /  Evening                   Patient Signature:  ____________________________________________________________    Date:  ____________________________________________________________

## 2017-08-03 NOTE — DISCHARGE INSTRUCTIONS
Dehydration, Adult  Dehydration is when you lose more fluids from the body than you take in. Vital organs like the kidneys, brain, and heart cannot function without a proper amount of fluids and salt. Any loss of fluids from the body can cause dehydration.   CAUSES   · Vomiting.  · Diarrhea.  · Excessive sweating.  · Excessive urine output.  · Fever.  SYMPTOMS   Mild dehydration  · Thirst.  · Dry lips.  · Slightly dry mouth.  Moderate dehydration  · Very dry mouth.  · Sunken eyes.  · Skin does not bounce back quickly when lightly pinched and released.  · Dark urine and decreased urine production.  · Decreased tear production.  · Headache.  Severe dehydration  · Very dry mouth.  · Extreme thirst.  · Rapid, weak pulse (more than 100 beats per minute at rest).  · Cold hands and feet.  · Not able to sweat in spite of heat and temperature.  · Rapid breathing.  · Blue lips.  · Confusion and lethargy.  · Difficulty being awakened.  · Minimal urine production.  · No tears.  DIAGNOSIS   Your caregiver will diagnose dehydration based on your symptoms and your exam. Blood and urine tests will help confirm the diagnosis. The diagnostic evaluation should also identify the cause of dehydration.  TREATMENT   Treatment of mild or moderate dehydration can often be done at home by increasing the amount of fluids that you drink. It is best to drink small amounts of fluid more often. Drinking too much at one time can make vomiting worse. Refer to the home care instructions below.  Severe dehydration needs to be treated at the hospital where you will probably be given intravenous (IV) fluids that contain water and electrolytes.  HOME CARE INSTRUCTIONS   · Ask your caregiver about specific rehydration instructions.  · Drink enough fluids to keep your urine clear or pale yellow.  · Drink small amounts frequently if you have nausea and vomiting.  · Eat as you normally do.  · Avoid:  · Foods or drinks high in sugar.  · Carbonated  drinks.  · Juice.  · Extremely hot or cold fluids.  · Drinks with caffeine.  · Fatty, greasy foods.  · Alcohol.  · Tobacco.  · Overeating.  · Gelatin desserts.  · Wash your hands well to avoid spreading bacteria and viruses.  · Only take over-the-counter or prescription medicines for pain, discomfort, or fever as directed by your caregiver.  · Ask your caregiver if you should continue all prescribed and over-the-counter medicines.  · Keep all follow-up appointments with your caregiver.  SEEK MEDICAL CARE IF:  · You have abdominal pain and it increases or stays in one area (localizes).  · You have a rash, stiff neck, or severe headache.  · You are irritable, sleepy, or difficult to awaken.  · You are weak, dizzy, or extremely thirsty.  SEEK IMMEDIATE MEDICAL CARE IF:   · You are unable to keep fluids down or you get worse despite treatment.  · You have frequent episodes of vomiting or diarrhea.  · You have blood or green matter (bile) in your vomit.  · You have blood in your stool or your stool looks black and tarry.  · You have not urinated in 6 to 8 hours, or you have only urinated a small amount of very dark urine.  · You have a fever.  · You faint.  MAKE SURE YOU:   · Understand these instructions.  · Will watch your condition.  · Will get help right away if you are not doing well or get worse.     This information is not intended to replace advice given to you by your health care provider. Make sure you discuss any questions you have with your health care provider.     Document Released: 12/18/2006 Document Revised: 03/11/2013 Document Reviewed: 08/06/2012  [employer] Patient Education ©2016 [employer]    Heat Disorders  Heat related disorders are illnesses caused by continued exposure to hot and humid environments, not drinking enough fluids, and/or your body failing to regulate its temperature correctly. People suffer from heat stress and heat related disorders when their bodies are unable to  compensate and cool down through sweating. With sufficient heat, sweating is not enough to keep you cool, and your body temperature can rise quickly. Very high body temperatures can damage your brain and other vital organs. High humidity (moisture in the air), adds to heat stress, because it is harder for sweat to evaporate and cool your body. Heat stress and disorders are not uncommon. Some medicines can increase your risk for heat related illness. Ask your caregiver about your medicines during periods of intense heat.   Heat related disorders include:  · Heatstroke. When you cannot sweat or regulate your body temperature in an adequate way. This is very dangerous and can be life threatening. Get emergency medical help.  · Heat exhaustion. Overheating causes heavy sweating and a fast heart rate. Your body can still regulate its own temperature.  · Heat cramps. Painful, uncontrollable muscle spasms. Can occur during heavy exercise in hot environments.  · Sunburn. Skin becomes red and painful (burned) after being out in the sun.  · Heat rash. Sweat ducts become blocked, which traps sweat under the skin. This causes blisters and red bumps and may cause an itchy or tingling feeling.  PREVENTING HEAT STRESS AND HEAT RELATED DISORDERS  Overheating can be dangerous and life threatening. When exercising, working, or doing other activities in hot and humid environments, do the following:  · Stay informed by listening to and watching local broadcast weather and safety updates during intense heat.  · Air conditioning is the best way to prevent heat disorders. If your home is not air conditioned, spend time in air conditioned places (malls, public libraries, or heat shelters set up by your local health department).  · Wear light-weight, light colored, loose fitting clothing. Wear as little clothing as possible when at home.  · Increase your fluid intake. Drink enough water and fluids to keep your urine clear or pale yellow. DO  "NOT WAIT UNTIL YOU ARE THIRSTY TO DRINK. You may already be heat stressed, and not recognize it.  · If your caregiver has suggested that you limit the amount of fluid you drink or has prescribed water pills for a medical problem, ask how much you should drink when the weather is hot.  · Do not drink liquids with alcohol, caffeine, or lots of sugar. They can cause more loss of body fluid.  · Heavy sweating drains your body's salt and minerals, which must be replaced. If you must exercise in the heat, a sports beverage can replace the salt and minerals you lose in sweat. If you are on a low-salt diet, check with your caregiver before drinking a sports beverage.  · Sunburn reduces your body's ability to cool itself and causes a loss of needed body fluids. If you go outdoors, protect yourself from the sun by wearing a wide-brimmed hat, along with sunglasses.  · Put on sunscreen of SPF 15 or higher, 30 minutes before going out. (The most effective products say \"broad spectrum\" or \"UVA/UVB protection\" on the label.) Reapply sunscreen frequently -- at least every 1-2 hours.  · Take added precautions when both the heat and humidity are high.  · Rest often.  · Even young and otherwise healthy people can become heat stressed and suffer from a heat disorder, if they participate in strenuous activities during hot weather.  · If you must be outdoors, try going out only during morning and evening hours, when it is cooler. Rest often in shady areas, so that your body's temperature can adjust.  · If your heart pounds or you are gasping for breath, STOP all activity. Go immediately to a cool area, or at least into the shade, and rest. This is especially true if you become lightheaded, confused, weak, or faint.  · Electric fans may make you comfortable, but they DO NOT prevent heat related problems.  SYMPTOMS   · Headache.  · Nosebleed.  · Weakness.  · You feel very hot.  · Muscle cramps.  · Restlessness.  · Fainting or " dizziness.  · Fast breathing and shortness of breath.  · Excessive sweating. (There may be little or no sweating in late stages of heat exhaustion.)  · Rapid pulse, heart pounding.  · Feeling sick to your stomach (nauseous, vomiting).  · Skin becoming cold and clammy, or excessively hot and dry.  HOME CARE INSTRUCTIONS   · Lie down and rest in a cool or air conditioned area.  · Drink enough water and fluids to keep your urine clear or pale yellow. Avoid fluids with caffeine or high sugar content. Avoid coffee, tea, alcohol or stimulants.  · Do not take salt tablets, unless advised by your caregiver.  · Avoid hot foods and heavy meals.  · Bathe or shower in cool water.  · Wear minimal clothing.  · Use a fan. Add cool or warm mist to the air, if possible.  · If possible, decrease the use of your stove or oven at home.  · Monitor adults at risk at least twice a day, watching closely for signs of heat exhaustion or heat stroke. Infants and young children also require more frequent watching.  · Never leave infants, children or pets in a parked car, even if the windows are cracked open.  · If you are 65 years of age or older, have a friend or relative call to check on you twice a day during a heat wave. If you know someone in this age group, check on them at least twice a day.  SEEK IMMEDIATE MEDICAL CARE IF:  · You have a hard time breathing.  · You vomit or pass blood in your stool.  · You have a seizure, feel dizzy or faint, or pass out.  · You develop severe sweating.  · Your skin is red, hot and dry (there is no sweating).  · Your urine turns a dark color or has blood in it.  · You are making very little or no urine.  · You are unable to keep fluids down.  · You develop chest or abdominal pain.  · You develop a throbbing headache.  · You develop nausea or confusion.  IF YOU OBSERVE SOMEONE WHO MIGHT HAVE HEAT STROKE  This can be life threatening. Call your local emergency services (911 in the [place]  · If the victim  is in the sun, get him or her to a shady area.  · Cool the victim rapidly, using whatever methods you have:  · Place the victim in a tub of cool water or a cool shower.  · Spray the victim with cool water from a garden hose, or sponge the person with cool water.  · Wrap the victim in a cool, wet sheet and fan them.  · If emergency medical help is delayed, call the hospital emergency room or your local emergency services (911 in the [place] for further instructions.  · Sometimes a victim's muscles will begin to twitch from heat stroke. If this happens, keep the victim from injuring himself. However, do not place any object in the mouth. Give fluids, unless the muscle twitching makes it difficult or unsafe to do so. If there is vomiting, make sure the airway remains open by turning the victim on his or her side.  Document Released: 12/15/2001 Document Revised: 03/11/2013 Document Reviewed: 10/04/2010  [employer]® Patient Information ©2014 [employer].    Heat Exhaustion Information  WHAT IS HEAT EXHAUSTION?  Heat exhaustion happens when your body gets overheated from hot weather or from exercise. Heat exhaustion makes the temperature of your skin and body go up.  Your body cools itself by sweating. If you do not drink enough water to replace what you sweat, you lose too much water and salt. This makes it harder for your body to produce more sweat. When you do not sweat enough, your body cannot cool down, and heat exhaustion may result.  Heat exhaustion can lead to heatstroke, which is a more serious illness.  WHO IS AT RISK FOR HEAT EXHAUSTION?  Anyone can get heat exhaustion. However, heat exhaustion is more likely when you are exercising or doing a physical activity. It is also more likely when you are in hot and humid weather or bright sunshine.  Heat exhaustion is also more likely to develop in:  · People who are age 65 or older.  · Children.  · People who have a medical condition such as heart disease, poor  circulation, sickle cell disease, or high blood pressure.  · People who have a fever.  · People who are very overweight (obese).  · People who are dehydrated from:  1. Drinking alcohol or caffeine.  2. Taking certain medicines, such as diuretics or stimulants.  WHAT ARE THE SYMPTOMS OF HEAT EXHAUSTION?  Symptoms of heat exhaustion include:  · A body temperature of up to 104°F (40°C).  · Moist, cool, and clammy skin.  · Dizziness.  · Headache.  · Nausea.  · Fatigue.  · Thirst.  · Dark-colored urine.  · Rapid pulse or heartbeat.  · Weakness.  · Muscle cramps.  · Confusion.  · Fainting.  WHAT SHOULD I DO IF I THINK I HAVE HEAT EXHAUSTION?  If you think that you have heat exhaustion, call your health care provider. Follow his or her instructions. You should also:  · Call a friend or a family member and ask someone to stay with you.  · Move to a cooler location, such as:  1. Into the shade.  2. In front of a fan.  3. Someplace that has air conditioning.  · Lie down and rest.  · Slowly drink nonalcoholic, noncaffeinated fluids.  · Take off any extra clothing or tight-fitting clothes.  · Take a cool bath or shower, if possible. If you do not have access to a bath or shower, dab or mist cool water on your skin.  WHY IS IT IMPORTANT TO TREAT HEAT EXHAUSTION?  It is extremely important to take care of yourself and treat heat exhaustion as soon as possible. Untreated heat exhaustion can turn into heatstroke. Symptoms of heatstroke include:  · A body temperature of 104°F (40°C) or higher.  · Hot, red skin that may be dry or moist.  · Severe headache.  · Nausea and vomiting.  · Muscle weakness and cramping.  · Confusion.  · Rapid breathing.  · Fainting.  · Seizure.  These symptoms may represent a serious problem that is an emergency. Do not wait to see if the symptoms will go away. Get medical help right away. Call your local emergency services (911 in the [place] Do not drive yourself to the hospital.   Heatstroke is a  life-threatening condition that requires urgent medical treatment. Do not treat heatstroke at home. Heatstroke should be treated by a health care professional and may require hospitalization. At the hospital, you may need to receive fluids through an IV tube:  · If you cannot drink any fluids.  · If you vomit any fluids that you drink.  · If your symptoms do not get better after one hour.  · If your symptoms get worse after one hour.  HOW CAN I PREVENT HEAT EXHAUSTION?  · Avoid outdoor activities on very hot or humid days.  · Do not exercise or do other physical activity when you are not feeling well.  · Drink plenty of nonalcoholic and caffeine-free fluids before and during physical activity.  · Take frequent breaks for rest during physical activity.  · Wear light-colored, loose-fitting, and lightweight clothing in the heat.  · Wear a hat and use sunscreen when exercising outdoors.  · Avoid being outside during the hottest times of the day.  · Check with your health care provider before you start any new activity, especially if you take medicine or have a medical condition.  · Start any new activity slowly and work up to your fitness level.  HOW CAN I HELP TO PROTECT ELDERLY RELATIVES AND NEIGHBORS FROM HEAT EXHAUSTION?  People who are age 65 or older are at greater risk for heat exhaustion. Their bodies have a harder time adjusting to heat. They are also more likely to have a medical condition or be on medicines that increase their risk for heat exhaustion. They may get heat exhaustion indoors if the heat is high for several days. You can help to protect them during hot weather by:  · Checking on them two or more times each day.  · Making sure that they are drinking plenty of cool, nonalcoholic, and caffeine-free fluids.  · Making sure that they use their air conditioner.  · Taking them to a location where air conditioning is available.  · Talking with their health care provider about their medical needs,  medicines, and fluid requirements.     This information is not intended to replace advice given to you by your health care provider. Make sure you discuss any questions you have with your health care provider.     Document Released: 09/26/2009 Document Revised: 05/03/2016 Document Reviewed: 11/25/2015  [employer] Interactive Patient Education ©2016 [employer]    Rehydration, Adult  Rehydration is the replacement of body fluids lost during dehydration. Dehydration is an extreme loss of body fluids to the point of body function impairment. There are many ways extreme fluid loss can occur, including vomiting, diarrhea, or excess sweating. Recovering from dehydration requires replacing lost fluids, continuing to eat to maintain strength, and avoiding foods and beverages that may contribute to further fluid loss or may increase nausea.  HOW TO REHYDRATE  In most cases, rehydration involves the replacement of not only fluids but also carbohydrates and basic body salts. Rehydration with an oral rehydration solution is one way to replace essential nutrients lost through dehydration.  An oral rehydration solution can be purchased at pharmacies, retail stores, and online. Premixed packets of powder that you combine with water to make a solution are also sold. You can prepare an oral rehydration solution at home by mixing the following ingredients together:   ·  - tsp table salt.  · ¾ tsp baking soda.  ·  tsp salt substitute containing potassium chloride.  · 1 tablespoons sugar.  · 1 L (34 oz) of water.  Be sure to use exact measurements. Including too much sugar can make diarrhea worse.  Drink ½-1 cup (120-240 mL) of oral rehydration solution each time you have diarrhea or vomit. If drinking this amount makes your vomiting worse, try drinking smaller amounts more often. For example, drink 1-3 tsp every 5-10 minutes.   A general rule for staying hydrated is to drink 1½-2 L of fluid per day. Talk to your caregiver about the  specific amount you should be drinking each day. Drink enough fluids to keep your urine clear or pale yellow.  EATING WHEN DEHYDRATED  Even if you have had severe sweating or you are having diarrhea, do not stop eating. Many healthy items in a normal diet are okay to continue eating while recovering from dehydration. The following tips can help you to lessen nausea when you eat:  · Ask someone else to prepare your food. Cooking smells may worsen nausea.  · Eat in a well-ventilated room away from cooking smells.  · Sit up when you eat. Avoid lying down until 1-2 hours after eating.  · Eat small amounts when you eat.  · Eat foods that are easy to digest. These include soft, well-cooked, or mashed foods.  FOODS AND BEVERAGES TO AVOID  Avoid eating or drinking the following foods and beverages that may increase nausea or further loss of fluid:   · Fruit juices with a high sugar content, such as concentrated juices.  · Alcohol.  · Beverages containing caffeine.  · Carbonated drinks. They may cause a lot of gas.  · Foods that may cause a lot of gas, such as cabbage, broccoli, and beans.  · Fatty, greasy, and fried foods.  · Spicy, very salty, and very sweet foods or drinks.  · Foods or drinks that are very hot or very cold. Consume food or drinks at or near room temperature.  · Foods that need a lot of chewing, such as raw vegetables.  · Foods that are sticky or hard to swallow, such as peanut butter.     This information is not intended to replace advice given to you by your health care provider. Make sure you discuss any questions you have with your health care provider.     Document Released: 03/11/2013 Document Revised: 09/11/2013 Document Reviewed: 03/11/2013  [employer] Interactive Patient Education ©2016 [employer]    Headaches, Frequently Asked Questions  MIGRAINE HEADACHES  Q: What is migraine? What causes it? How can I treat it?  A: Generally, migraine headaches begin as a dull ache. Then they develop into a  "constant, throbbing, and pulsating pain. You may experience pain at the temples. You may experience pain at the front or back of one or both sides of the head. The pain is usually accompanied by a combination of:  · Nausea.   · Vomiting.   · Sensitivity to light and noise.   Some people (about 15%) experience an aura (see below) before an attack. The cause of migraine is believed to be chemical reactions in the brain. Treatment for migraine may include over-the-counter or prescription medications. It may also include self-help techniques. These include relaxation training and biofeedback.   Q: What is an aura?  A: About 15% of people with migraine get an \"aura\". This is a sign of neurological symptoms that occur before a migraine headache. You may see wavy or jagged lines, dots, or flashing lights. You might experience tunnel vision or blind spots in one or both eyes. The aura can include visual or auditory hallucinations (something imagined). It may include disruptions in smell (such as strange odors), taste or touch. Other symptoms include:  · Numbness.   · A \"pins and needles\" sensation.   · Difficulty in recalling or speaking the correct word.   These neurological events may last as long as 60 minutes. These symptoms will fade as the headache begins.  Q: What is a trigger?  A: Certain physical or environmental factors can lead to or \"trigger\" a migraine. These include:  · Foods.   · Hormonal changes.   · Weather.   · Stress.   It is important to remember that triggers are different for everyone. To help prevent migraine attacks, you need to figure out which triggers affect you. Keep a headache diary. This is a good way to track triggers. The diary will help you talk to your healthcare professional about your condition.  Q: Does weather affect migraines?  A: Bright sunshine, hot, humid conditions, and drastic changes in barometric pressure may lead to, or \"trigger,\" a migraine attack in some people. But studies " "have shown that weather does not act as a trigger for everyone with migraines.  Q: What is the link between migraine and hormones?  A: Hormones start and regulate many of your body's functions. Hormones keep your body in balance within a constantly changing environment. The levels of hormones in your body are unbalanced at times. Examples are during menstruation, pregnancy, or menopause. That can lead to a migraine attack. In fact, about three quarters of all women with migraine report that their attacks are related to the menstrual cycle.   Q: Is there an increased risk of stroke for migraine sufferers?  A: The likelihood of a migraine attack causing a stroke is very remote. That is not to say that migraine sufferers cannot have a stroke associated with their migraines. In persons under age 40, the most common associated factor for stroke is migraine headache. But over the course of a person's normal life span, the occurrence of migraine headache may actually be associated with a reduced risk of dying from cerebrovascular disease due to stroke.   Q: What are acute medications for migraine?  A: Acute medications are used to treat the pain of the headache after it has started. Examples over-the-counter medications, NSAIDs, ergots, and triptans.   Q: What are the triptans?  A: Triptans are the newest class of abortive medications. They are specifically targeted to treat migraine. Triptans are vasoconstrictors. They moderate some chemical reactions in the brain. The triptans work on receptors in your brain. Triptans help to restore the balance of a neurotransmitter called serotonin. Fluctuations in levels of serotonin are thought to be a main cause of migraine.   Q: Are over-the-counter medications for migraine effective?  A: Over-the-counter, or \"OTC,\" medications may be effective in relieving mild to moderate pain and associated symptoms of migraine. But you should see your caregiver before beginning any treatment " "regimen for migraine.   Q: What are preventive medications for migraine?  A: Preventive medications for migraine are sometimes referred to as \"prophylactic\" treatments. They are used to reduce the frequency, severity, and length of migraine attacks. Examples of preventive medications include antiepileptic medications, antidepressants, beta-blockers, calcium channel blockers, and NSAIDs (nonsteroidal anti-inflammatory drugs).  Q: Why are anticonvulsants used to treat migraine?  A: During the past few years, there has been an increased interest in antiepileptic drugs for the prevention of migraine. They are sometimes referred to as \"anticonvulsants\". Both epilepsy and migraine may be caused by similar reactions in the brain.   Q: Why are antidepressants used to treat migraine?  A: Antidepressants are typically used to treat people with depression. They may reduce migraine frequency by regulating chemical levels, such as serotonin, in the brain.   Q: What alternative therapies are used to treat migraine?  A: The term \"alternative therapies\" is often used to describe treatments considered outside the scope of conventional Western medicine. Examples of alternative therapy include acupuncture, acupressure, and yoga. Another common alternative treatment is herbal therapy. Some herbs are believed to relieve headache pain. Always discuss alternative therapies with your caregiver before proceeding. Some herbal products contain arsenic and other toxins.  TENSION HEADACHES  Q: What is a tension-type headache? What causes it? How can I treat it?  A: Tension-type headaches occur randomly. They are often the result of temporary stress, anxiety, fatigue, or anger. Symptoms include soreness in your temples, a tightening band-like sensation around your head (a \"vice-like\" ache). Symptoms can also include a pulling feeling, pressure sensations, and pina head and neck muscles. The headache begins in your forehead, temples, or " "the back of your head and neck. Treatment for tension-type headache may include over-the-counter or prescription medications. Treatment may also include self-help techniques such as relaxation training and biofeedback.  CLUSTER HEADACHES  Q: What is a cluster headache? What causes it? How can I treat it?  A: Cluster headache gets its name because the attacks come in groups. The pain arrives with little, if any, warning. It is usually on one side of the head. A tearing or bloodshot eye and a runny nose on the same side of the headache may also accompany the pain. Cluster headaches are believed to be caused by chemical reactions in the brain. They have been described as the most severe and intense of any headache type. Treatment for cluster headache includes prescription medication and oxygen.  SINUS HEADACHES  Q: What is a sinus headache? What causes it? How can I treat it?  A: When a cavity in the bones of the face and skull (a sinus) becomes inflamed, the inflammation will cause localized pain. This condition is usually the result of an allergic reaction, a tumor, or an infection. If your headache is caused by a sinus blockage, such as an infection, you will probably have a fever. An x-ray will confirm a sinus blockage. Your caregiver's treatment might include antibiotics for the infection, as well as antihistamines or decongestants.   REBOUND HEADACHES  Q: What is a rebound headache? What causes it? How can I treat it?  A: A pattern of taking acute headache medications too often can lead to a condition known as \"rebound headache.\" A pattern of taking too much headache medication includes taking it more than 2 days per week or in excessive amounts. That means more than the label or a caregiver advises. With rebound headaches, your medications not only stop relieving pain, they actually begin to cause headaches. Doctors treat rebound headache by tapering the medication that is being overused. Sometimes your " caregiver will gradually substitute a different type of treatment or medication. Stopping may be a challenge. Regularly overusing a medication increases the potential for serious side effects. Consult a caregiver if you regularly use headache medications more than 2 days per week or more than the label advises.  ADDITIONAL QUESTIONS AND ANSWERS  Q: What is biofeedback?  A: Biofeedback is a self-help treatment. Biofeedback uses special equipment to monitor your body's involuntary physical responses. Biofeedback monitors:  · Breathing.   · Pulse.   · Heart rate.   · Temperature.   · Muscle tension.   · Brain activity.   Biofeedback helps you refine and perfect your relaxation exercises. You learn to control the physical responses that are related to stress. Once the technique has been mastered, you do not need the equipment any more.  Q: Are headaches hereditary?  A: Four out of five (80%) of people that suffer report a family history of migraine. Scientists are not sure if this is genetic or a family predisposition. Despite the uncertainty, a child has a 50% chance of having migraine if one parent suffers. The child has a 75% chance if both parents suffer.   Q: Can children get headaches?  A: By the time they reach high school, most young people have experienced some type of headache. Many safe and effective approaches or medications can prevent a headache from occurring or stop it after it has begun.   Q: What type of doctor should I see to diagnose and treat my headache?  A: Start with your primary caregiver. Discuss his or her experience and approach to headaches. Discuss methods of classification, diagnosis, and treatment. Your caregiver may decide to recommend you to a headache specialist, depending upon your symptoms or other physical conditions. Having diabetes, allergies, etc., may require a more comprehensive and inclusive approach to your headache. The National Head[employer] Foundation will provide, upon  request, a list of [facility] physician members in your state.  Document Released: 03/09/2005 Document Revised: 03/11/2013 Document Reviewed: 08/17/2009  [employer]® Patient Information ©2013 [employer], [place].

## 2017-08-03 NOTE — ED NOTES
Given discharge instructions, verbalized understanding, left with all belongings, ambulates to ED lobby.

## 2017-08-24 ENCOUNTER — HOSPITAL ENCOUNTER (OUTPATIENT)
Facility: MEDICAL CENTER | Age: 37
End: 2017-08-24
Attending: NURSE PRACTITIONER
Payer: COMMERCIAL

## 2017-08-24 PROCEDURE — 87077 CULTURE AEROBIC IDENTIFY: CPT

## 2017-08-24 PROCEDURE — 87086 URINE CULTURE/COLONY COUNT: CPT

## 2017-08-24 PROCEDURE — 87186 SC STD MICRODIL/AGAR DIL: CPT

## 2017-08-27 ENCOUNTER — HOSPITAL ENCOUNTER (EMERGENCY)
Facility: MEDICAL CENTER | Age: 37
End: 2017-08-27
Payer: COMMERCIAL

## 2017-08-27 VITALS
HEIGHT: 69 IN | OXYGEN SATURATION: 100 % | TEMPERATURE: 98 F | HEART RATE: 110 BPM | SYSTOLIC BLOOD PRESSURE: 162 MMHG | BODY MASS INDEX: 24.82 KG/M2 | WEIGHT: 167.55 LBS | RESPIRATION RATE: 20 BRPM | DIASTOLIC BLOOD PRESSURE: 104 MMHG

## 2017-08-27 LAB
BACTERIA UR CULT: ABNORMAL
SIGNIFICANT IND 70042: ABNORMAL
SOURCE SOURCE: ABNORMAL

## 2017-08-27 PROCEDURE — 302449 STATCHG TRIAGE ONLY (STATISTIC)

## 2017-08-27 RX ORDER — NITROFURANTOIN 25; 75 MG/1; MG/1
100 CAPSULE ORAL 2 TIMES DAILY
Status: SHIPPED | COMMUNITY
End: 2018-10-10

## 2017-08-27 ASSESSMENT — PAIN SCALES - GENERAL: PAINLEVEL_OUTOF10: 8

## 2017-08-28 NOTE — ED NOTES
Presents with exacerbation of chronic migraine.  She also stated being treated for a UTI since this past Friday with Nitrofurantoin.

## 2017-11-18 ENCOUNTER — HOSPITAL ENCOUNTER (OUTPATIENT)
Dept: RADIOLOGY | Facility: MEDICAL CENTER | Age: 37
End: 2017-11-18
Attending: NURSE PRACTITIONER
Payer: COMMERCIAL

## 2017-11-18 ENCOUNTER — HOSPITAL ENCOUNTER (OUTPATIENT)
Dept: LAB | Facility: MEDICAL CENTER | Age: 37
End: 2017-11-18
Attending: NURSE PRACTITIONER
Payer: COMMERCIAL

## 2017-11-18 DIAGNOSIS — N20.0 KIDNEY STONE: ICD-10-CM

## 2017-11-18 LAB
25(OH)D3 SERPL-MCNC: 48 NG/ML (ref 30–100)
ALBUMIN SERPL BCP-MCNC: 4.2 G/DL (ref 3.2–4.9)
ALBUMIN/GLOB SERPL: 1.8 G/DL
ALP SERPL-CCNC: 63 U/L (ref 30–99)
ALT SERPL-CCNC: 12 U/L (ref 2–50)
ANION GAP SERPL CALC-SCNC: 8 MMOL/L (ref 0–11.9)
AST SERPL-CCNC: 14 U/L (ref 12–45)
BASOPHILS # BLD AUTO: 0.8 % (ref 0–1.8)
BASOPHILS # BLD: 0.05 K/UL (ref 0–0.12)
BILIRUB SERPL-MCNC: 0.7 MG/DL (ref 0.1–1.5)
BUN SERPL-MCNC: 13 MG/DL (ref 8–22)
CALCIUM SERPL-MCNC: 9 MG/DL (ref 8.5–10.5)
CHLORIDE SERPL-SCNC: 105 MMOL/L (ref 96–112)
CO2 SERPL-SCNC: 23 MMOL/L (ref 20–33)
CREAT SERPL-MCNC: 0.69 MG/DL (ref 0.5–1.4)
CRP SERPL HS-MCNC: 0.14 MG/DL (ref 0–0.75)
DHEA-S SERPL-MCNC: 50.5 UG/DL (ref 60.9–337)
EOSINOPHIL # BLD AUTO: 0.52 K/UL (ref 0–0.51)
EOSINOPHIL NFR BLD: 8.6 % (ref 0–6.9)
ERYTHROCYTE [DISTWIDTH] IN BLOOD BY AUTOMATED COUNT: 42.1 FL (ref 35.9–50)
ERYTHROCYTE [SEDIMENTATION RATE] IN BLOOD BY WESTERGREN METHOD: 3 MM/HOUR (ref 0–20)
ESTRADIOL SERPL-MCNC: 274 PG/ML
GFR SERPL CREATININE-BSD FRML MDRD: >60 ML/MIN/1.73 M 2
GLOBULIN SER CALC-MCNC: 2.4 G/DL (ref 1.9–3.5)
GLUCOSE SERPL-MCNC: 97 MG/DL (ref 65–99)
HCT VFR BLD AUTO: 43.8 % (ref 37–47)
HGB BLD-MCNC: 14.8 G/DL (ref 12–16)
IMM GRANULOCYTES # BLD AUTO: 0.01 K/UL (ref 0–0.11)
IMM GRANULOCYTES NFR BLD AUTO: 0.2 % (ref 0–0.9)
LYMPHOCYTES # BLD AUTO: 1.72 K/UL (ref 1–4.8)
LYMPHOCYTES NFR BLD: 28.6 % (ref 22–41)
MCH RBC QN AUTO: 32.6 PG (ref 27–33)
MCHC RBC AUTO-ENTMCNC: 33.8 G/DL (ref 33.6–35)
MCV RBC AUTO: 96.5 FL (ref 81.4–97.8)
MONOCYTES # BLD AUTO: 0.54 K/UL (ref 0–0.85)
MONOCYTES NFR BLD AUTO: 9 % (ref 0–13.4)
NEUTROPHILS # BLD AUTO: 3.18 K/UL (ref 2–7.15)
NEUTROPHILS NFR BLD: 52.8 % (ref 44–72)
NRBC # BLD AUTO: 0 K/UL
NRBC BLD AUTO-RTO: 0 /100 WBC
PLATELET # BLD AUTO: 256 K/UL (ref 164–446)
PMV BLD AUTO: 10.8 FL (ref 9–12.9)
POTASSIUM SERPL-SCNC: 4.1 MMOL/L (ref 3.6–5.5)
PROGEST SERPL-MCNC: 0.72 NG/ML
PROT SERPL-MCNC: 6.6 G/DL (ref 6–8.2)
RBC # BLD AUTO: 4.54 M/UL (ref 4.2–5.4)
SODIUM SERPL-SCNC: 136 MMOL/L (ref 135–145)
T4 FREE SERPL-MCNC: 0.71 NG/DL (ref 0.53–1.43)
TSH SERPL DL<=0.005 MIU/L-ACNC: 1.44 UIU/ML (ref 0.3–3.7)
VIT B12 SERPL-MCNC: 510 PG/ML (ref 211–911)
WBC # BLD AUTO: 6 K/UL (ref 4.8–10.8)

## 2017-11-18 PROCEDURE — 84443 ASSAY THYROID STIM HORMONE: CPT

## 2017-11-18 PROCEDURE — 84439 ASSAY OF FREE THYROXINE: CPT

## 2017-11-18 PROCEDURE — 82627 DEHYDROEPIANDROSTERONE: CPT

## 2017-11-18 PROCEDURE — 85652 RBC SED RATE AUTOMATED: CPT

## 2017-11-18 PROCEDURE — 84144 ASSAY OF PROGESTERONE: CPT

## 2017-11-18 PROCEDURE — 80053 COMPREHEN METABOLIC PANEL: CPT

## 2017-11-18 PROCEDURE — 36415 COLL VENOUS BLD VENIPUNCTURE: CPT

## 2017-11-18 PROCEDURE — 85025 COMPLETE CBC W/AUTO DIFF WBC: CPT

## 2017-11-18 PROCEDURE — 86140 C-REACTIVE PROTEIN: CPT

## 2017-11-18 PROCEDURE — 82306 VITAMIN D 25 HYDROXY: CPT

## 2017-11-18 PROCEDURE — 74000 DX-ABDOMEN-1 VIEW: CPT

## 2017-11-18 PROCEDURE — 82670 ASSAY OF TOTAL ESTRADIOL: CPT

## 2017-11-18 PROCEDURE — 82607 VITAMIN B-12: CPT

## 2018-10-10 ENCOUNTER — OFFICE VISIT (OUTPATIENT)
Dept: MEDICAL GROUP | Facility: PHYSICIAN GROUP | Age: 38
End: 2018-10-10
Payer: COMMERCIAL

## 2018-10-10 VITALS
WEIGHT: 160 LBS | TEMPERATURE: 97.4 F | HEIGHT: 69 IN | BODY MASS INDEX: 23.7 KG/M2 | DIASTOLIC BLOOD PRESSURE: 82 MMHG | SYSTOLIC BLOOD PRESSURE: 114 MMHG | RESPIRATION RATE: 14 BRPM | OXYGEN SATURATION: 99 % | HEART RATE: 69 BPM

## 2018-10-10 DIAGNOSIS — N20.0 KIDNEY STONES, CALCIUM OXALATE: ICD-10-CM

## 2018-10-10 DIAGNOSIS — Z00.00 ROUTINE ADULT HEALTH MAINTENANCE: ICD-10-CM

## 2018-10-10 DIAGNOSIS — F33.0 MILD EPISODE OF RECURRENT MAJOR DEPRESSIVE DISORDER (HCC): ICD-10-CM

## 2018-10-10 DIAGNOSIS — R79.89 LOW SERUM VITAMIN D: ICD-10-CM

## 2018-10-10 DIAGNOSIS — Z12.31 ENCOUNTER FOR SCREENING MAMMOGRAM FOR BREAST CANCER: ICD-10-CM

## 2018-10-10 DIAGNOSIS — Z13.220 SCREENING FOR CHOLESTEROL LEVEL: ICD-10-CM

## 2018-10-10 DIAGNOSIS — G43.009 MIGRAINE WITHOUT AURA AND WITHOUT STATUS MIGRAINOSUS, NOT INTRACTABLE: ICD-10-CM

## 2018-10-10 DIAGNOSIS — M35.9 CONNECTIVE TISSUE DISEASE, UNDIFFERENTIATED (HCC): ICD-10-CM

## 2018-10-10 PROBLEM — F32.A DEPRESSION: Status: ACTIVE | Noted: 2018-10-10

## 2018-10-10 PROBLEM — N85.2 ENLARGED UTERUS: Status: RESOLVED | Noted: 2017-02-06 | Resolved: 2018-10-10

## 2018-10-10 PROCEDURE — 99214 OFFICE O/P EST MOD 30 MIN: CPT | Performed by: NURSE PRACTITIONER

## 2018-10-10 RX ORDER — DICLOFENAC POTASSIUM 50 MG/1
50 POWDER, FOR SOLUTION ORAL PRN
Qty: 3 EACH | Refills: 1 | Status: SHIPPED | OUTPATIENT
Start: 2018-10-10 | End: 2019-09-03 | Stop reason: SDUPTHER

## 2018-10-10 ASSESSMENT — PATIENT HEALTH QUESTIONNAIRE - PHQ9: CLINICAL INTERPRETATION OF PHQ2 SCORE: 0

## 2018-10-10 NOTE — ASSESSMENT & PLAN NOTE
"This is a new problem to me but a chronic health problem for this patient that is controlled with current medications and lifestyle measures. The patient has had a lot of difficult life events over the past 10 years, starting with disabling health problems for herself as well as a divorce. She was ultimately diagnosed, after years of ruling things out, with connective tissue disease undifferentiated. Due to this condition she ends up having a lot of nerve end pain, which her Rheumatologist had started her on multiple drugs, but finally ending on cymbalta 60 mg. The patient has been on this dose for the past 6 years and reports that it is working perfect to both control her nerve end pain as well as keep her mood stable and keep her \"feeling like herself\". She is also seeing a therapist as needed, but a minimum of 2 times per year. She is aware that she can increase that frequency if she needs it. She reports that she has a lot of support in her life and she feels safe. Denies any suicidal ideations.   "

## 2018-10-10 NOTE — PROGRESS NOTES
"CC:   Chief Complaint   Patient presents with   • Establish Care       HISTORY OF THE PRESENT ILLNESS: Patient is a 38 y.o. female. This pleasant patient is here today establish care and discuss multiple issues as listed below.    Health Maintenance: Completed      Connective tissue disease, undifferentiated (HCC)  This is a new problem to me but a chronic health problem for this patient that is controlled with current medications and lifestyle measures. This issue for this patient began when she was 30. Symptoms started as bone pain in her hips, arms, and all joints. She was experiencing severe fatigue. She developed severe facial rashes. Had 2 episodes where she thought she was having a heart attack with the \"pressure of an elephant on my chest\". She ended up having an angiogram that was negative, was referred to pulmonary for a workup which was negative. Her pain then progressed to be a tissue pain, severe fibromyalgia and reynauds. She would have frequent mini seizures at night along with fevers >102. The patient ended up being trialed on \"every steroid\" with prednisone being the only effective steroid to help her symptoms, which unfortunately made her gain 40 pounds. She next was put on methotrexate for about 6-7 months. During this time period she stated that she was watching herself become depleted and was seriously considering applying for disability. At this time, a friend of the family offered to pay for treatment through a holistic provider, which she ended up going to. The patient went through 28 treatments daily where she was \"injected with something\" that would increase her temperature to 103 degrees and higher in order to \"reset her immune system\". Two weeks into the treatment the patient states that she started feeling better and was able to eventually titrate off all of her medications. Unfortunately, she needed a PICC line for the administration of this medication that ended up causing a DVT as " "well as septicemia where the patient ended up receiving IV antibiotics for about 1.5 months. She is now only taking cymbalta for the nerve ending pain, but she does note that this medication is also doing \"wonders for her mind\" as she has \"mental clarity with the medication\". Other than some nerve ending pain, she has been symptom free since 2015. The majority of this issue was managed by her Rheumatologist, who has since retired. She was recommended that she didn't need follow up with a new Rheumatologist unless she began experiencing symptoms again.    Mild episode of recurrent major depressive disorder (HCC)  This is a new problem to me but a chronic health problem for this patient that is controlled with current medications and lifestyle measures. The patient has had a lot of difficult life events over the past 10 years, starting with disabling health problems for herself as well as a divorce. She was ultimately diagnosed, after years of ruling things out, with connective tissue disease undifferentiated. Due to this condition she ends up having a lot of nerve end pain, which her Rheumatologist had started her on multiple drugs, but finally ending on cymbalta 60 mg. The patient has been on this dose for the past 6 years and reports that it is working perfect to both control her nerve end pain as well as keep her mood stable and keep her \"feeling like herself\". She is also seeing a therapist as needed, but a minimum of 2 times per year. She is aware that she can increase that frequency if she needs it. She reports that she has a lot of support in her life and she feels safe. Denies any suicidal ideations.     Migraine without aura or status migrainosus  This is a chronic health problem that is well controlled with current medications and lifestyle measures. The patient has had this issue for at least the last 10 years. She reports that she does not get an aura, but she gets a stabbing feeling in her right temple " "that lets her know that a migraine is coming. She has had about 3 migraines this year, which she describes as \"disabling\" if she does not have medications to stop the progress. In the past she was taking Cambia powder name brand that ended up costing too much so she was eventually prescribed diclofenac tab to be used PRN for migraines. She believes that her current meds are almost  and would like a new refill.     Low serum vitamin D  This is a new problem to me but a chronic health problem for this patient that is controlled with current medications and lifestyle measures. The patient in the past was told that she was low on vitamin d and was recommended to take vitamin d supplements. The last record in EPIC of a vitamin D level 48 is from 17.        Kidney stones, calcium oxalate  This is a new problem to me but a chronic health problem for this patient that is controlled with current lifestyle measures. The patient states that she has \"peppered kidney stones on the right side 3mm\" and she is followed by Urology Dr. Tyson in [place] for this issue. She has an appointment for follow up next week. Currently she tries to stay hydrated and avoids oxalates.    Allergies: Chloraprep one step; Codeine; Tramadol; Turmeric; Mobic; Gluten meal; Lactose; Other misc; Sulfa drugs; Tape; and Tree nuts food allergy    Current Outpatient Prescriptions Ordered in [facility]   Medication Sig Dispense Refill   • Cholecalciferol (VITAMIN D PO) Take 6,000 Units by mouth.     • Diclofenac Potassium 50 MG Pack Take 50 mg by mouth as needed (Migraines). 3 Each 1   • duloxetine (CYMBALTA) 60 MG CPEP Take 60 mg by mouth every morning.       No current [facility]-ordered facility-administered medications on file.        Past Medical History:   Diagnosis Date   • Bronchitis    • DVT (deep venous thrombosis) ([facility]) 2015    right arm from PICC line   • Encounter for long-term (current) use of other medications    • Fibroids    • " Migraine    • Pain 1/2017    lower abdomen   • Psychiatric problem     depression/anxiety   • Renal disorder     stones   • Seizure (HCC) 2013    Febrile   • Sepsis (HCC) 4/2015    from PICC line   • Unspecified diffuse connective tissue disease     no issues at this time 1/2017       Past Surgical History:   Procedure Laterality Date   • HYSTERECTOMY ROBOTIC SINGLE SITE  2/6/2017    Procedure: HYSTERECTOMY ROBOTIC SINGLE SITE BILATERAL SALPINGECTOMY ;  Surgeon: Zaynab Dailey M.D.;  Location: SURGERY [facility];  Service:    • CYSTOSCOPY  2/6/2017    Procedure: CYSTOSCOPY;  Surgeon: Zaynab Dailey M.D.;  Location: SURGERY [facility];  Service:        Social History   Substance Use Topics   • Smoking status: Never Smoker   • Smokeless tobacco: Never Used   • Alcohol use Yes      Comment: 1-2 per day wine       Social History     Social History Narrative   • No narrative on file       Family History   Problem Relation Age of Onset   • Other Mother         Lupus   • Hypertension Father    • Heart Disease Father    • Obesity Father    • Cancer Maternal Grandmother 36        breast cancer       ROS:     - Constitutional:  Negative for fever, chills, unexpected weight change, night sweats, body aches, sleep issues,  and fatigue/generalized weakness.     - HEENT:  Negative for headaches, vision changes, hearing changes, ear pain, tinnitus, ear discharge, rhinorrhea, sinus congestion, sneezing, sore throat, and neck pain.      - Respiratory:  Negative for cough, shortness of breath, sputum production, hemoptysis, chest congestion, dyspnea, wheezing, and crackles.      - Cardiovascular:  Negative for chest pain, palpitations, PEREZ, paroxsymal nocturnal dyspnea, orthopnea, and bilateral lower extremity edema.     - Gastrointestinal:  Negative for heartburn, nausea, vomiting, abdominal pain, hematochezia, melena, diarrhea, constipation, and greasy/foul-smelling stools.     - Genitourinary:  Negative for  "dysuria, nocturia, polyuria, hematuria, pyuria, urinary urgency, urinary frequency, and urinary incontinence.     - Musculoskeletal:  Negative for myalgias, back pain, and joint pain.     - Skin:  Negative for rash, sores, lumps, itching, cyanotic skin color change.     - Neurological:  Negative for dizziness, tingling, tremors, focal sensory deficit, focal weakness and headaches.     - Endo/Heme/Allergies:  Does not bruise/bleed easily. Denies cold/heat intolerance.     - Psychiatric/Behavioral: Negative for depression, suicidal/homicidal ideation and memory loss.        - NOTE: All other systems reviewed and are negative, except as in HPI.      Exam: Blood pressure 114/82, pulse 69, temperature 36.3 °C (97.4 °F), resp. rate 14, height 1.753 m (5' 9\"), weight 72.6 kg (160 lb), last menstrual period 01/29/2017, SpO2 99 %, not currently breastfeeding. Body mass index is 23.63 kg/m².    General:  Normal appearing. No distress.  HEENT:  Normocephalic. Eyes conjunctiva clear lids without ptosis, pupils equal and reactive to light accommodation, ears normal shape and contour.   Neck:  Supple without JVD or bruit. Thyroid is not enlarged.  Pulmonary:  Clear to ausculation.  Normal effort. No rales, ronchi, or wheezing.  Cardiovascular:  Regular rate and rhythm without murmur. Carotid and radial pulses are intact and equal bilaterally.  Neurologic:  Grossly nonfocal  Skin:  Warm and dry.  No obvious lesions.  Musculoskeletal:  Normal gait. No extremity cyanosis, clubbing, or edema.  Psych:  Normal mood and affect. Alert and oriented x3. Judgment and insight is normal.  LABS: 10/27/2014: Results reviewed and discussed with the patient, questions answered.      Assessment/Plan  1. Encounter for screening mammogram for breast cancer  Order provided.  - MA-SCREENING MAMMO BILAT W/TOMOSYNTHESIS W/CAD; Future    2. Connective tissue disease, undifferentiated (HCC)  This is a chronic health problem that is well controlled with " current medications and lifestyle measures. Continue taking cymbalta. Continue to monitor.    3. Mild episode of recurrent major depressive disorder (HCC)  This is a chronic health problem that is well controlled with current medications and lifestyle measures. The patient sees a therapist 2 times per year and more if needed, continue with this plan. Continue taking cymbalta.    4. Migraine without aura and without status migrainosus, not intractable  This is a chronic health problem that is well controlled with current medications and lifestyle measures. The patient has a few migraines per year, but she describes them as disabling if she does not have an abortive medication. She stated that she has tried multiple medications for migraines, but the most effective was Cambia. Generic for Cambia powder is available and patient is very interested in trying it. Prescription provided.  - Diclofenac Potassium 50 MG Pack; Take 50 mg by mouth as needed (Migraines).  Dispense: 3 Each; Refill: 1    5. Low serum vitamin D  This is a chronic health problem that is well controlled with current medications and lifestyle measures. The patient is taking a daily Vit D supplement. She has not had an updated lab since 11/2017, orders for lab provided.  - VITAMIN D,25 HYDROXY; Future    6. Screening for cholesterol level  The patients last lipid profile was completed in 2014, and at this time her LDL was mildly elevated at 113. Plan to recheck a fasting lipid panel.  - LIPID PROFILE; Future    7. Routine adult health maintenance  It has been about one year since the patient has had basic lab work. She is establishing with this provider as her PCP and is her first provider at [facility]. Plan to check a CBC and CMP.  - CBC WITH DIFFERENTIAL; Future  - COMP METABOLIC PANEL; Future    8. Kidney stones, calcium oxalate  This is a chronic health problem that is well controlled with current medications and lifestyle measures. This issue is  managed by [facility] in [facility]. Continue staying hydrated and avoiding oxalates.       Educated in proper administration of medication(s) ordered today including safety, possible SE, risks, benefits, rationale and alternatives to therapy.   Supportive care, differential diagnoses, and indications for immediate follow-up discussed with patient.    Pathogenesis of diagnosis discussed including typical length and natural progression.    Instructed to return to clinic or nearest emergency department for any change in condition, further concerns, or worsening of symptoms.  Patient states understanding of the plan of care and discharge instructions.    Consent for records release signed to order medical records from PCP, Rheumatologist, OB/GYN.    Return in about 6 months (around 4/10/2019), or if symptoms worsen or fail to improve, for Follow up Labs, Follow up Medications, Preventative Annual.

## 2018-10-10 NOTE — ASSESSMENT & PLAN NOTE
"This is a new problem to me but a chronic health problem for this patient that is controlled with current lifestyle measures. The patient states that she has \"peppered kidney stones on the right side 3mm\" and she is followed by Urology Dr. Tyson in [place] for this issue. She has an appointment for follow up next week. Currently she tries to stay hydrated and avoids oxalates.  "

## 2018-10-10 NOTE — ASSESSMENT & PLAN NOTE
"This is a new problem to me but a chronic health problem for this patient that is controlled with current medications and lifestyle measures. This issue for this patient began when she was 30. Symptoms started as bone pain in her hips, arms, and all joints. She was experiencing severe fatigue. She developed severe facial rashes. Had 2 episodes where she thought she was having a heart attack with the \"pressure of an elephant on my chest\". She ended up having an angiogram that was negative, was referred to pulmonary for a workup which was negative. Her pain then progressed to be a tissue pain, severe fibromyalgia and reynauds. She would have frequent mini seizures at night along with fevers >102. The patient ended up being trialed on \"every steroid\" with prednisone being the only effective steroid to help her symptoms, which unfortunately made her gain 40 pounds. She next was put on methotrexate for about 6-7 months. During this time period she stated that she was watching herself become depleted and was seriously considering applying for disability. At this time, a friend of the family offered to pay for treatment through a holistic provider, which she ended up going to. The patient went through 28 treatments daily where she was \"injected with something\" that would increase her temperature to 103 degrees and higher in order to \"reset her immune system\". Two weeks into the treatment the patient states that she started feeling better and was able to eventually titrate off all of her medications. Unfortunately, she needed a PICC line for the administration of this medication that ended up causing a DVT as well as septicemia where the patient ended up receiving IV antibiotics for about 1.5 months. She is now only taking cymbalta for the nerve ending pain, but she does note that this medication is also doing \"wonders for her mind\" as she has \"mental clarity with the medication\". Other than some nerve ending pain, she has " been symptom free since 2015. The majority of this issue was managed by her Rheumatologist, who has since retired. She was recommended that she didn't need follow up with a new Rheumatologist unless she began experiencing symptoms again.

## 2018-10-10 NOTE — LETTER
[facility]  Mayda Frazier M.D.  [address] [place] [address]  Fax: [phone]   Authorization for Release/Disclosure of   Protected Health Information   Name: CHRIS MAILE MARTINEZ : 1980 SSN: [id number]   Address: [address] [place] [address]    [place] [address] Phone:    [phone] (home) [phone] (work)   I authorize the entity listed below to release/disclose the PHI below to:   [facility]/Mayda Frazier M.D. and FAM Hayes   Provider or Entity Name:  Dr. Frazier   Address   City, State, [facility]   Phone:      Fax:     Reason for request: continuity of care   Information to be released:    [  ] LAST COLONOSCOPY,  including any PATH REPORT and follow-up  [  ] LAST FIT/COLOGUARD RESULT [  ] LAST DEXA  [  ] LAST MAMMOGRAM  [  ] LAST PAP  [  ] LAST LABS [  ] RETINA EXAM REPORT  [  ] IMMUNIZATION RECORDS  [X] Release all info      [  ] Check here and initial the line next to each item to release ALL health information INCLUDING  _____ Care and treatment for drug and / or alcohol abuse  _____ HIV testing, infection status, or AIDS  _____ Genetic Testing    DATES OF SERVICE OR TIME PERIOD TO BE DISCLOSED: _____________  I understand and acknowledge that:  * This Authorization may be revoked at any time by you in writing, except if your health information has already been used or disclosed.  * Your health information that will be used or disclosed as a result of you signing this authorization could be re-disclosed by the recipient. If this occurs, your re-disclosed health information may no longer be protected by State or Federal laws.  * You may refuse to sign this Authorization. Your refusal will not affect your ability to obtain treatment.  * This Authorization becomes effective upon signing and will  on (date) __________.      If no date is indicated, this Authorization will  one (1) year from the signature date.    Name: Chris Menajosseline Martinez    Signature:   Date:     10/10/2018       PLEASE FAX REQUESTED RECORDS BACK TO: [phone]

## 2018-10-10 NOTE — LETTER
[facility]  Mayda Frazier M.D.  [address]   [place] [address]  Fax: [phone]   Authorization for Release/Disclosure of   Protected Health Information   Name: TIANA GR MICHELLE : 1980 SSN: [id number]   Address: [address] [place] [address] [place] [address] Phone:    [phone] (home) [phone] (work)   I authorize the entity listed below to release/disclose the PHI below to:   [facility]/Mayda Frazier M.D. and FAM Hayes   Provider or Entity Name:  Hadley Valencia   Address   City, State, Zip   Phone:      Fax:     Reason for request: continuity of care   Information to be released:    [  ] LAST COLONOSCOPY,  including any PATH REPORT and follow-up  [  ] LAST FIT/COLOGUARD RESULT [  ] LAST DEXA  [  ] LAST MAMMOGRAM  [  ] LAST PAP  [  ] LAST LABS [  ] RETINA EXAM REPORT  [  ] IMMUNIZATION RECORDS  [X] Release all info      [  ] Check here and initial the line next to each item to release ALL health information INCLUDING  _____ Care and treatment for drug and / or alcohol abuse  _____ HIV testing, infection status, or AIDS  _____ Genetic Testing    DATES OF SERVICE OR TIME PERIOD TO BE DISCLOSED: _____________  I understand and acknowledge that:  * This Authorization may be revoked at any time by you in writing, except if your health information has already been used or disclosed.  * Your health information that will be used or disclosed as a result of you signing this authorization could be re-disclosed by the recipient. If this occurs, your re-disclosed health information may no longer be protected by State or Federal laws.  * You may refuse to sign this Authorization. Your refusal will not affect your ability to obtain treatment.  * This Authorization becomes effective upon signing and will  on (date) __________.      If no date is indicated, this Authorization will  one (1) year from the signature date.    Name: Tiana Gr Michelle    Signature:   Date:     10/10/2018       PLEASE FAX REQUESTED RECORDS BACK TO: [phone]

## 2018-10-10 NOTE — ASSESSMENT & PLAN NOTE
"This is a chronic health problem that is well controlled with current medications and lifestyle measures. The patient has had this issue for at least the last 10 years. She reports that she does not get an aura, but she gets a stabbing feeling in her right temple that lets her know that a migraine is coming. She has had about 3 migraines this year, which she describes as \"disabling\" if she does not have medications to stop the progress. In the past she was taking Cambia powder name brand that ended up costing too much so she was eventually prescribed diclofenac tab to be used PRN for migraines. She believes that her current meds are almost  and would like a new refill.   "

## 2018-10-10 NOTE — LETTER
[facility]  Mayda Frazier M.D.  [address] [place] [address]  Fax: [phone]   Authorization for Release/Disclosure of   Protected Health Information   Name: CHRIS GRIFFITH : 1980 SSN: [id number]   Address: [address] [place] [address] [place] [address] Phone:    [phone] (home) [phone] (work)   I authorize the entity listed below to release/disclose the PHI below to:   [facility]/Mayda Frazier M.D. and FAM Hayes   Provider or Entity Name:  Zaynab Hutson [facility] [place]   Address   [facility], [facility], [facility]   Phone:      Fax:     Reason for request: continuity of care   Information to be released:    [  ] LAST COLONOSCOPY,  including any PATH REPORT and follow-up  [  ] LAST FIT/COLOGUARD RESULT [  ] LAST DEXA  [  ] LAST MAMMOGRAM  [X] LAST PAP  [  ] LAST LABS [  ] RETINA EXAM REPORT  [  ] IMMUNIZATION RECORDS  [  ] Release all info      [  ] Check here and initial the line next to each item to release ALL health information INCLUDING  _____ Care and treatment for drug and / or alcohol abuse  _____ HIV testing, infection status, or AIDS  _____ Genetic Testing    DATES OF SERVICE OR TIME PERIOD TO BE DISCLOSED: _____________  I understand and acknowledge that:  * This Authorization may be revoked at any time by you in writing, except if your health information has already been used or disclosed.  * Your health information that will be used or disclosed as a result of you signing this authorization could be re-disclosed by the recipient. If this occurs, your re-disclosed health information may no longer be protected by State or Federal laws.  * You may refuse to sign this Authorization. Your refusal will not affect your ability to obtain treatment.  * This Authorization becomes effective upon signing and will  on (date) __________.      If no date is indicated, this Authorization will  one (1) year from the signature date.    Name: Chris Ambrose  Michelle    Signature:   Date:     10/10/2018       PLEASE FAX REQUESTED RECORDS BACK TO: [phone]

## 2018-10-10 NOTE — ASSESSMENT & PLAN NOTE
This is a new problem to me but a chronic health problem for this patient that is controlled with current medications and lifestyle measures. The patient in the past was told that she was low on vitamin d and was recommended to take vitamin d supplements. The last record in EPIC of a vitamin D level 48 is from 11/18/17.

## 2018-10-12 PROBLEM — G47.30 SLEEP APNEA WITH USE OF CONTINUOUS POSITIVE AIRWAY PRESSURE (CPAP): Status: ACTIVE | Noted: 2018-10-12

## 2019-01-09 ENCOUNTER — HOSPITAL ENCOUNTER (OUTPATIENT)
Dept: LAB | Facility: MEDICAL CENTER | Age: 39
End: 2019-01-09
Attending: NURSE PRACTITIONER
Payer: COMMERCIAL

## 2019-01-09 DIAGNOSIS — Z00.00 ROUTINE ADULT HEALTH MAINTENANCE: ICD-10-CM

## 2019-01-09 DIAGNOSIS — Z13.220 SCREENING FOR CHOLESTEROL LEVEL: ICD-10-CM

## 2019-01-09 DIAGNOSIS — R79.89 LOW SERUM VITAMIN D: ICD-10-CM

## 2019-01-09 LAB
25(OH)D3 SERPL-MCNC: 38 NG/ML (ref 30–100)
ALBUMIN SERPL BCP-MCNC: 4.3 G/DL (ref 3.2–4.9)
ALBUMIN/GLOB SERPL: 1.5 G/DL
ALP SERPL-CCNC: 70 U/L (ref 30–99)
ALT SERPL-CCNC: 14 U/L (ref 2–50)
ANION GAP SERPL CALC-SCNC: 11 MMOL/L (ref 0–11.9)
AST SERPL-CCNC: 13 U/L (ref 12–45)
BASOPHILS # BLD AUTO: 0.9 % (ref 0–1.8)
BASOPHILS # BLD: 0.05 K/UL (ref 0–0.12)
BILIRUB SERPL-MCNC: 0.5 MG/DL (ref 0.1–1.5)
BUN SERPL-MCNC: 16 MG/DL (ref 8–22)
CALCIUM SERPL-MCNC: 9.3 MG/DL (ref 8.5–10.5)
CHLORIDE SERPL-SCNC: 107 MMOL/L (ref 96–112)
CHOLEST SERPL-MCNC: 229 MG/DL (ref 100–199)
CO2 SERPL-SCNC: 23 MMOL/L (ref 20–33)
CREAT SERPL-MCNC: 0.74 MG/DL (ref 0.5–1.4)
EOSINOPHIL # BLD AUTO: 0.38 K/UL (ref 0–0.51)
EOSINOPHIL NFR BLD: 6.8 % (ref 0–6.9)
ERYTHROCYTE [DISTWIDTH] IN BLOOD BY AUTOMATED COUNT: 43.1 FL (ref 35.9–50)
GLOBULIN SER CALC-MCNC: 2.8 G/DL (ref 1.9–3.5)
GLUCOSE SERPL-MCNC: 99 MG/DL (ref 65–99)
HCT VFR BLD AUTO: 43.9 % (ref 37–47)
HDLC SERPL-MCNC: 67 MG/DL
HGB BLD-MCNC: 14.7 G/DL (ref 12–16)
IMM GRANULOCYTES # BLD AUTO: 0 K/UL (ref 0–0.11)
IMM GRANULOCYTES NFR BLD AUTO: 0 % (ref 0–0.9)
LDLC SERPL CALC-MCNC: 145 MG/DL
LYMPHOCYTES # BLD AUTO: 1.68 K/UL (ref 1–4.8)
LYMPHOCYTES NFR BLD: 30.1 % (ref 22–41)
MCH RBC QN AUTO: 32.6 PG (ref 27–33)
MCHC RBC AUTO-ENTMCNC: 33.5 G/DL (ref 33.6–35)
MCV RBC AUTO: 97.3 FL (ref 81.4–97.8)
MONOCYTES # BLD AUTO: 0.39 K/UL (ref 0–0.85)
MONOCYTES NFR BLD AUTO: 7 % (ref 0–13.4)
NEUTROPHILS # BLD AUTO: 3.08 K/UL (ref 2–7.15)
NEUTROPHILS NFR BLD: 55.2 % (ref 44–72)
NRBC # BLD AUTO: 0 K/UL
NRBC BLD-RTO: 0 /100 WBC
PLATELET # BLD AUTO: 289 K/UL (ref 164–446)
PMV BLD AUTO: 10.6 FL (ref 9–12.9)
POTASSIUM SERPL-SCNC: 3.9 MMOL/L (ref 3.6–5.5)
PROT SERPL-MCNC: 7.1 G/DL (ref 6–8.2)
RBC # BLD AUTO: 4.51 M/UL (ref 4.2–5.4)
SODIUM SERPL-SCNC: 141 MMOL/L (ref 135–145)
TRIGL SERPL-MCNC: 85 MG/DL (ref 0–149)
WBC # BLD AUTO: 5.6 K/UL (ref 4.8–10.8)

## 2019-01-09 PROCEDURE — 85025 COMPLETE CBC W/AUTO DIFF WBC: CPT

## 2019-01-09 PROCEDURE — 80053 COMPREHEN METABOLIC PANEL: CPT

## 2019-01-09 PROCEDURE — 36415 COLL VENOUS BLD VENIPUNCTURE: CPT

## 2019-01-09 PROCEDURE — 82306 VITAMIN D 25 HYDROXY: CPT

## 2019-01-09 PROCEDURE — 80061 LIPID PANEL: CPT

## 2019-03-27 ENCOUNTER — OFFICE VISIT (OUTPATIENT)
Dept: URGENT CARE | Facility: PHYSICIAN GROUP | Age: 39
End: 2019-03-27
Payer: COMMERCIAL

## 2019-03-27 VITALS
SYSTOLIC BLOOD PRESSURE: 120 MMHG | DIASTOLIC BLOOD PRESSURE: 78 MMHG | TEMPERATURE: 97 F | OXYGEN SATURATION: 93 % | BODY MASS INDEX: 25.33 KG/M2 | WEIGHT: 171 LBS | RESPIRATION RATE: 14 BRPM | HEIGHT: 69 IN | HEART RATE: 89 BPM

## 2019-03-27 DIAGNOSIS — J11.1 INFLUENZA-LIKE ILLNESS: ICD-10-CM

## 2019-03-27 DIAGNOSIS — J22 LOWER RESP. TRACT INFECTION: ICD-10-CM

## 2019-03-27 PROCEDURE — 99214 OFFICE O/P EST MOD 30 MIN: CPT | Performed by: PHYSICIAN ASSISTANT

## 2019-03-27 RX ORDER — AZITHROMYCIN 250 MG/1
TABLET, FILM COATED ORAL
Qty: 6 TAB | Refills: 0 | Status: SHIPPED | OUTPATIENT
Start: 2019-03-27 | End: 2021-08-19

## 2019-04-01 ASSESSMENT — ENCOUNTER SYMPTOMS
CHILLS: 1
RHINORRHEA: 1
WHEEZING: 0
SPUTUM PRODUCTION: 1
TINGLING: 0
EYE REDNESS: 0
HEADACHES: 0
SHORTNESS OF BREATH: 0
COUGH: 1
NECK PAIN: 0
VOMITING: 0
SORE THROAT: 1
DIARRHEA: 0
EYE DISCHARGE: 0
MYALGIAS: 1
DIZZINESS: 0

## 2019-04-01 NOTE — PROGRESS NOTES
"Subjective:      Tiana Martinez is a 38 y.o. female who presents with Cough (chest congestion uri x1 week )            Cough   This is a new problem. The current episode started in the past 7 days. The problem has been gradually worsening. The problem occurs every few minutes. The cough is productive of sputum. Associated symptoms include chills, myalgias, nasal congestion, postnasal drip, rhinorrhea and a sore throat. Pertinent negatives include no chest pain, ear pain, eye redness, headaches, rash, shortness of breath or wheezing. Exacerbated by: lying down.  She has tried OTC cough suppressant for the symptoms. The treatment provided mild relief.       Review of Systems   Constitutional: Positive for chills and malaise/fatigue.   HENT: Positive for congestion, postnasal drip, rhinorrhea and sore throat. Negative for ear discharge and ear pain.    Eyes: Negative for discharge and redness.   Respiratory: Positive for cough and sputum production. Negative for shortness of breath and wheezing.    Cardiovascular: Negative for chest pain.   Gastrointestinal: Negative for diarrhea and vomiting.   Genitourinary: Negative for dysuria.   Musculoskeletal: Positive for myalgias. Negative for neck pain.   Skin: Negative for itching and rash.   Neurological: Negative for dizziness, tingling and headaches.   All other systems reviewed and are negative.         Objective:     /78   Pulse 89   Temp 36.1 °C (97 °F) (Temporal)   Resp 14   Ht 1.753 m (5' 9\")   Wt 77.6 kg (171 lb)   LMP 01/29/2017 (Exact Date)   SpO2 93%   BMI 25.25 kg/m²    PMH:  has a past medical history of Bronchitis (2009); DVT (deep venous thrombosis) ([facility]) (2015); Encounter for long-term (current) use of other medications; Fibroids (2017); Migraine; Pain (1/2017); Psychiatric problem; Renal disorder; Seizure ([facility]) (2013); Sepsis ([facility]) (4/2015); Sleep apnea with use of continuous positive airway pressure (CPAP) (10/12/2018); and " "Unspecified diffuse connective tissue disease.  MEDS:   Current Outpatient Prescriptions:   •  azithromycin (ZITHROMAX) 250 MG Tab, Take 2 tabs today, then 1 tab daily til completed., Disp: 6 Tab, Rfl: 0  •  duloxetine (CYMBALTA) 60 MG CPEP, Take 60 mg by mouth every morning., Disp: , Rfl:   •  Cholecalciferol (VITAMIN D PO), Take 6,000 Units by mouth., Disp: , Rfl:   •  Diclofenac Potassium 50 MG Pack, Take 50 mg by mouth as needed (Migraines)., Disp: 3 Each, Rfl: 1  ALLERGIES:   Allergies   Allergen Reactions   • Chloraprep One Step Rash and Itching     Pt gets severe weeping rash where antiseptic was used   • Codeine Vomiting   • Tramadol      \"got dizzy, couldn't move\"   • Turmeric Anaphylaxis   • Mobic Hives   • Gluten Meal      Gluten intolerant   • Lactose      Lactose intolerant   • Other Misc      Adhesives including EKG stickers rash at contact site   • Sulfa Drugs      Per allergy blood test   • Tape Rash     All tapes rash at contact site   • Tree Nuts Food Allergy              SURGHX:   Past Surgical History:   Procedure Laterality Date   • HYSTERECTOMY ROBOTIC SINGLE SITE  2/6/2017    Procedure: HYSTERECTOMY ROBOTIC SINGLE SITE BILATERAL SALPINGECTOMY ;  Surgeon: Zaynab Dailey M.D.;  Location: SURGERY [facility];  Service:    • CYSTOSCOPY  2/6/2017    Procedure: CYSTOSCOPY;  Surgeon: Zaynab Dailey M.D.;  Location: SURGERY [facility];  Service:      SOCHX:  reports that she has never smoked. She has never used smokeless tobacco. She reports that she drinks alcohol. She reports that she does not use drugs.  FH: Family history was reviewed, no pertinent findings to report    Physical Exam   Constitutional: She is oriented to person, place, and time. She appears well-developed and well-nourished.   HENT:   Head: Normocephalic and atraumatic.   Mouth/Throat: No oropharyngeal exudate.   Ears- Canals clear- TM- with clear fluid effusions bilaterally.   Pos. PND, with slight erythema- " without tonsillar edema or exudate.   Mild discharge noted bilaterally- to nares.      Eyes: Pupils are equal, round, and reactive to light. EOM are normal.   Neck: Normal range of motion. Neck supple.   Cardiovascular: Normal rate and regular rhythm.    No murmur heard.  Pulmonary/Chest: Effort normal and breath sounds normal. No respiratory distress.   Musculoskeletal: Normal range of motion. She exhibits no tenderness.   Lymphadenopathy:     She has no cervical adenopathy.   Neurological: She is alert and oriented to person, place, and time.   Skin: Skin is warm. No rash noted.   Psychiatric: She has a normal mood and affect. Her behavior is normal.   Vitals reviewed.              Assessment/Plan:     1. Influenza-like illness  2. Lower resp. tract infection  - azithromycin (ZITHROMAX) 250 MG Tab; Take 2 tabs today, then 1 tab daily til completed.  Dispense: 6 Tab; Refill: 0    Appears that patient may of had influenza at the onset of symptoms- now with worsening cough. Will trial the patient on the above.    Encouraged OTC supportive meds PRN. Humidification, increase fluids, avoid night time dairy.   Patient given precautionary s/sx that mandate immediate follow up and evaluation in the ED. Advised of risks of not doing so.    DDX, Supportive care, and indications for immediate follow-up discussed with patient.    Instructed to return to clinic or nearest emergency department if we are not available for any change in condition, further concerns, or worsening of symptoms.    The patient demonstrated a good understanding and agreed with the treatment plan.

## 2019-04-20 ENCOUNTER — PATIENT MESSAGE (OUTPATIENT)
Dept: MEDICAL GROUP | Facility: PHYSICIAN GROUP | Age: 39
End: 2019-04-20

## 2019-04-22 NOTE — TELEPHONE ENCOUNTER
From: Tiana Martinez  To: FAM Hayes  Sent: 4/20/2019 9:20 AM PDT  Subject: Prescription Question    Good morning,  My Rx of Cymbalta just ran out from Dr. Frazier's script. Is there a way you can place the refill order with [facility] on [address] instead of me having to come in to see you?   I'm doing and feeling well, been a bad pt. and haven't scheduled my mammo. since I started a new job-but I will, it's important. Also, I've been mindful of what I eat regarding cholesterol and have started back on my supplements. :)     Sorry for the novel!     Thank you kindly,  Tiana

## 2019-04-23 RX ORDER — DULOXETIN HYDROCHLORIDE 60 MG/1
60 CAPSULE, DELAYED RELEASE ORAL EVERY MORNING
Qty: 90 CAP | Refills: 3 | Status: SHIPPED | OUTPATIENT
Start: 2019-04-23 | End: 2020-06-30 | Stop reason: SDUPTHER

## 2019-04-23 NOTE — TELEPHONE ENCOUNTER
Requested Prescriptions     Pending Prescriptions Disp Refills   • DULoxetine (CYMBALTA) 60 MG Cap DR Particles delayed-release capsule 90 Cap 3     Sig: Take 1 Cap by mouth every morning.       JODY Hayes.

## 2019-05-07 ENCOUNTER — OFFICE VISIT (OUTPATIENT)
Dept: URGENT CARE | Facility: PHYSICIAN GROUP | Age: 39
End: 2019-05-07
Payer: COMMERCIAL

## 2019-05-07 VITALS
OXYGEN SATURATION: 99 % | SYSTOLIC BLOOD PRESSURE: 140 MMHG | BODY MASS INDEX: 25.18 KG/M2 | WEIGHT: 170 LBS | TEMPERATURE: 96.7 F | HEIGHT: 69 IN | HEART RATE: 98 BPM | RESPIRATION RATE: 18 BRPM | DIASTOLIC BLOOD PRESSURE: 80 MMHG

## 2019-05-07 DIAGNOSIS — N30.01 ACUTE CYSTITIS WITH HEMATURIA: ICD-10-CM

## 2019-05-07 LAB
APPEARANCE UR: NORMAL
BILIRUB UR STRIP-MCNC: NEGATIVE MG/DL
COLOR UR AUTO: NORMAL
GLUCOSE UR STRIP.AUTO-MCNC: NEGATIVE MG/DL
KETONES UR STRIP.AUTO-MCNC: NEGATIVE MG/DL
LEUKOCYTE ESTERASE UR QL STRIP.AUTO: NORMAL
NITRITE UR QL STRIP.AUTO: NEGATIVE
PH UR STRIP.AUTO: 6 [PH] (ref 5–8)
PROT UR QL STRIP: 30 MG/DL
RBC UR QL AUTO: NORMAL
SP GR UR STRIP.AUTO: 1.02
UROBILINOGEN UR STRIP-MCNC: 0.2 MG/DL

## 2019-05-07 PROCEDURE — 99214 OFFICE O/P EST MOD 30 MIN: CPT | Performed by: FAMILY MEDICINE

## 2019-05-07 PROCEDURE — 81002 URINALYSIS NONAUTO W/O SCOPE: CPT | Performed by: FAMILY MEDICINE

## 2019-05-07 RX ORDER — NITROFURANTOIN 25; 75 MG/1; MG/1
100 CAPSULE ORAL EVERY 12 HOURS
Qty: 10 CAP | Refills: 0 | Status: SHIPPED | OUTPATIENT
Start: 2019-05-07 | End: 2019-05-12

## 2019-05-10 NOTE — PROGRESS NOTES
"Subjective:      Chief Complaint   Patient presents with   • Dysuria     blood in [id number]            Dysuria   This is a new problem. The current episode started in the past 2 days. The problem occurs every urination. The problem has been unchanged. The quality of the pain is described as burning. There has been no fever. Pt is not sexually active. There is no history of pyelonephritis. Associated symptoms include blood in urine and urgency. Pertinent negatives include no chills, discharge, flank pain, nausea or vomiting. Pt has tried nothing for the symptoms. There is no history of recurrent UTIs.     Social History     Social History   • Marital status:      Spouse name: N/A   • Number of children: N/A   • Years of education: N/A     Occupational History   • Not on file.     Social History Main Topics   • Smoking status: Never Smoker   • Smokeless tobacco: Never Used   • Alcohol use Yes      Comment: 1-2 per day wine   • Drug use: No   • Sexual activity: Yes     Partners: Male     Birth control/ protection: Surgical      Comment:  14 years     Other Topics Concern   • Not on file     Social History Narrative   • No narrative on file         Family History   Problem Relation Age of Onset   • Other Mother         Lupus   • Hypertension Father    • Heart Disease Father    • Obesity Father    • Cancer Maternal Grandmother 36        breast cancer         Allergies   Allergen Reactions   • Chloraprep One Step Rash and Itching     Pt gets severe weeping rash where antiseptic was used   • Codeine Vomiting   • Tramadol      \"got dizzy, couldn't move\"   • Turmeric Anaphylaxis   • Mobic Hives   • Other Misc      Adhesives including EKG stickers rash at contact site   • Sulfa Drugs      Per allergy blood test   • Tape Rash     All tapes rash at contact site   • Tree Nuts Food Allergy                    Current Outpatient Prescriptions on File Prior to Visit   Medication Sig Dispense Refill   • DULoxetine " "(CYMBALTA) 60 MG Cap DR Particles delayed-release capsule Take 1 Cap by mouth every morning. 90 Cap 3   • Cholecalciferol (VITAMIN D PO) Take 6,000 Units by mouth.     • azithromycin (ZITHROMAX) 250 MG Tab Take 2 tabs today, then 1 tab daily til completed. (Patient not taking: Reported on 5/7/2019) 6 Tab 0   • Diclofenac Potassium 50 MG Pack Take 50 mg by mouth as needed (Migraines). 3 Each 1     No current facility-administered medications on file prior to visit.        Review of Systems   Constitutional: Negative for chills.   Respiratory: Negative for shortness of breath.    Cardiovascular: Negative for chest pain.   Gastrointestinal: Negative for nausea, vomiting and abdominal pain.   Genitourinary: Positive for dysuria, urgency. Negative for flank pain.   Skin: Negative for rash.   Neurological: Negative for dizziness and headaches.   All other systems reviewed and are negative.         Objective:      /80   Pulse 98   Temp 35.9 °C (96.7 °F) (Temporal)   Resp 18   Ht 1.753 m (5' 9\")   Wt 77.1 kg (170 lb)   SpO2 99%       Physical Exam   Constitutional: pt is oriented to person, place, and time. Pt appears well-developed and well-nourished. No distress.   HENT:   Head: Normocephalic and atraumatic.   Mouth/Throat: Mucous membranes are normal.   Eyes: Conjunctivae and EOM are normal. Pupils are equal, round, and reactive to light. Right eye exhibits no discharge. Left eye exhibits no discharge. No scleral icterus.   Neck: Normal range of motion. Neck supple.   Cardiovascular: Normal rate, regular rhythm, normal heart sounds and intact distal pulses.    No murmur heard.  Pulmonary/Chest: Effort normal and breath sounds normal. No respiratory distress. Pt has no wheezes,  rales.   Abdominal: Bowel sounds are normal. Pt exhibits no distension and no mass. There is no tenderness. There is no rebound, no guarding and no CVA tenderness.   Neurological: pt is alert and oriented to person, place, and time. "   Skin: Skin is warm and dry.   Psychiatric: behavior is normal.   Nursing note and vitals reviewed.           Lab Results   Component Value Date/Time    POCCOLOR dark yellow 05/07/2019 07:03 PM    POCAPPEAR cloudy 05/07/2019 07:03 PM    POCLEUKEST moderate 05/07/2019 07:03 PM    POCNITRITE negative 05/07/2019 07:03 PM    POCUROBILIGE 0.2 05/07/2019 07:03 PM    POCPROTEIN 30 05/07/2019 07:03 PM    POCURPH 6.0 05/07/2019 07:03 PM    POCBLOOD large 05/07/2019 07:03 PM    POCSPGRV 1.020 05/07/2019 07:03 PM    POCKETONES negative 05/07/2019 07:03 PM    POCBILIRUBIN negative 05/07/2019 07:03 PM    POCGLUCUA negative 05/07/2019 07:03 PM            Assessment/Plan:     1. Acute cystitis with hematuria  UA c/w UTI  - nitrofurantoin monohyd macro (MACROBID) 100 MG Cap; Take 1 Cap by mouth every 12 hours for 5 days.  Dispense: 10 Cap; Refill: 0  - POCT Urinalysis

## 2019-05-17 RX ORDER — GRANULES FOR ORAL 3 G/1
3 POWDER ORAL ONCE
Qty: 1 EACH | Refills: 0 | Status: SHIPPED | OUTPATIENT
Start: 2019-05-17 | End: 2019-05-17

## 2019-05-29 ENCOUNTER — HOSPITAL ENCOUNTER (OUTPATIENT)
Facility: MEDICAL CENTER | Age: 39
End: 2019-05-29
Attending: PHYSICIAN ASSISTANT
Payer: COMMERCIAL

## 2019-05-29 ENCOUNTER — OFFICE VISIT (OUTPATIENT)
Dept: URGENT CARE | Facility: MEDICAL CENTER | Age: 39
End: 2019-05-29
Payer: COMMERCIAL

## 2019-05-29 VITALS
TEMPERATURE: 97.5 F | BODY MASS INDEX: 25.71 KG/M2 | OXYGEN SATURATION: 98 % | SYSTOLIC BLOOD PRESSURE: 142 MMHG | WEIGHT: 173.6 LBS | HEIGHT: 69 IN | HEART RATE: 110 BPM | DIASTOLIC BLOOD PRESSURE: 92 MMHG

## 2019-05-29 DIAGNOSIS — N30.00 ACUTE CYSTITIS WITHOUT HEMATURIA: ICD-10-CM

## 2019-05-29 LAB
APPEARANCE UR: CLEAR
BILIRUB UR STRIP-MCNC: NEGATIVE MG/DL
COLOR UR AUTO: YELLOW
GLUCOSE UR STRIP.AUTO-MCNC: NEGATIVE MG/DL
KETONES UR STRIP.AUTO-MCNC: NEGATIVE MG/DL
LEUKOCYTE ESTERASE UR QL STRIP.AUTO: NEGATIVE
NITRITE UR QL STRIP.AUTO: NEGATIVE
PH UR STRIP.AUTO: 6 [PH] (ref 5–8)
PROT UR QL STRIP: NEGATIVE MG/DL
RBC UR QL AUTO: NEGATIVE
SP GR UR STRIP.AUTO: 1.01
UROBILINOGEN UR STRIP-MCNC: 0.2 MG/DL

## 2019-05-29 PROCEDURE — 87086 URINE CULTURE/COLONY COUNT: CPT

## 2019-05-29 PROCEDURE — 99213 OFFICE O/P EST LOW 20 MIN: CPT | Performed by: PHYSICIAN ASSISTANT

## 2019-05-29 PROCEDURE — 81002 URINALYSIS NONAUTO W/O SCOPE: CPT | Performed by: PHYSICIAN ASSISTANT

## 2019-05-29 ASSESSMENT — ENCOUNTER SYMPTOMS
FEVER: 0
ABDOMINAL PAIN: 1
FLANK PAIN: 0

## 2019-05-30 DIAGNOSIS — N30.00 ACUTE CYSTITIS WITHOUT HEMATURIA: ICD-10-CM

## 2019-05-30 NOTE — PROGRESS NOTES
Subjective:   Tiana Martinez is a 39 y.o. female who presents today with   Chief Complaint   Patient presents with   • UTI     already on second round of antibiotics, felt relief but comes back uncoftable       UTI   This is a new problem. The current episode started more than 1 month ago. The problem occurs intermittently. The problem has been unchanged. Associated symptoms include abdominal pain (Lower abdominal tenderness). Pertinent negatives include no fever.     Patient was seen about a month ago and given treatment for UTI.  Following her finishing out a prescription for 5 days of antibiotics patient states she still had symptoms and was given another 7 days of antibiotics by her primary care doctor.  She states that she finished this over the past weekend and began having similar symptoms.  Last night she had a double aching pain with intermittent sharp pains especially on the left side.  Patient does state that she has a history of kidney stones.  She denies any dysuria, frequency, urgency.  Patient denies any fevers, back pain, nausea, vomiting, diarrhea.  Patient history is significant for hysterectomy.  PMH:  has a past medical history of Bronchitis (2009); DVT (deep venous thrombosis) ([facility]) (2015); Encounter for long-term (current) use of other medications; Fibroids (2017); Migraine; Pain (1/2017); Psychiatric problem; Renal disorder; Seizure ([facility]) (2013); Sepsis ([facility]) (4/2015); Sleep apnea with use of continuous positive airway pressure (CPAP) (10/12/2018); and Unspecified diffuse connective tissue disease.  MEDS:   Current Outpatient Prescriptions:   •  DULoxetine (CYMBALTA) 60 MG Cap DR Particles delayed-release capsule, Take 1 Cap by mouth every morning., Disp: 90 Cap, Rfl: 3  •  Cholecalciferol (VITAMIN D PO), Take 6,000 Units by mouth., Disp: , Rfl:   •  Diclofenac Potassium 50 MG Pack, Take 50 mg by mouth as needed (Migraines)., Disp: 3 Each, Rfl: 1  •  azithromycin (ZITHROMAX)  "250 MG Tab, Take 2 tabs today, then 1 tab daily til completed. (Patient not taking: Reported on 5/7/2019), Disp: 6 Tab, Rfl: 0  ALLERGIES:   Allergies   Allergen Reactions   • Chloraprep One Step Rash and Itching     Pt gets severe weeping rash where antiseptic was used   • Codeine Vomiting   • Tramadol      \"got dizzy, couldn't move\"   • Turmeric Anaphylaxis   • Mobic Hives   • Other Misc      Adhesives including EKG stickers rash at contact site   • Sulfa Drugs      Per allergy blood test   • Tape Rash     All tapes rash at contact site   • Tree Nuts Food Allergy              SURGHX:   Past Surgical History:   Procedure Laterality Date   • HYSTERECTOMY ROBOTIC SINGLE SITE  2/6/2017    Procedure: HYSTERECTOMY ROBOTIC SINGLE SITE BILATERAL SALPINGECTOMY ;  Surgeon: Zaynab Dailey M.D.;  Location: SURGERY [facility];  Service:    • CYSTOSCOPY  2/6/2017    Procedure: CYSTOSCOPY;  Surgeon: Zaynab Dailey M.D.;  Location: SURGERY [facility];  Service:      SOCHX:  reports that she has never smoked. She has never used smokeless tobacco. She reports that she drinks alcohol. She reports that she does not use drugs.  FH: Reviewed with patient, not pertinent to this visit.       Review of Systems   Constitutional: Negative for fever.   Gastrointestinal: Positive for abdominal pain (Lower abdominal tenderness).   Genitourinary: Negative for dysuria, flank pain, frequency, hematuria and urgency.   All other systems reviewed and are negative.       Objective:   /92   Pulse (!) 110   Temp 36.4 °C (97.5 °F)   Ht 1.753 m (5' 9\")   Wt 78.7 kg (173 lb 9.6 oz)   LMP 01/29/2017 (Exact Date)   SpO2 98%   BMI 25.64 kg/m²   Physical Exam   Constitutional: She appears well-developed and well-nourished. No distress.   HENT:   Head: Normocephalic and atraumatic.   Right Ear: Hearing normal.   Left Ear: Hearing normal.   Eyes: Pupils are equal, round, and reactive to light.   Cardiovascular: Normal rate, " regular rhythm and normal heart sounds.    Pulmonary/Chest: Effort normal and breath sounds normal.   Abdominal: Soft. Bowel sounds are normal. She exhibits no distension and no mass. There is tenderness in the suprapubic area. There is no guarding.   Genitourinary:   Genitourinary Comments: Patient deferred  exam   Musculoskeletal:   Normal movement in all 4 extremities   Neurological: She is alert. Coordination normal.   Skin: Skin is warm and dry.   Psychiatric: She has a normal mood and affect.   Nursing note and vitals reviewed.  Negative CVA tenderness    Results for orders placed or performed in visit on 05/29/19   POCT Urinalysis [[id number]]   Result Value Ref Range    POC Color yellow Negative    POC Appearance clear Negative    POC Leukocyte Esterase negative Negative    POC Nitrites negative Negative    POC Urobiligen 0.2 Negative (0.2) mg/dL    POC Protein negative Negative mg/dL    POC Urine PH 6.0 5.0 - 8.0    POC Blood negative Negative    POC Specific Gravity 1.015 <1.005 - >1.030    POC Ketones negative Negative mg/dL    POC Bilirubin negative Negative mg/dL    POC Glucose negative Negative mg/dL         Assessment/Plan:   Assessment    1. Acute cystitis without hematuria  - POCT Urinalysis [[id number]]  - Urine Culture [[id number]]; Future  Discussed with patient negative UA today.  Encourage patient to follow-up with urology as she is likely due for further imaging given her last CT has been a couple of years.  Patient is in close follow-up with urology and plans on making an appointment with him as soon as she can get in to have them do a further evaluation.  Patient does have a screening tool for her urine and plans on screening her urine for possibility of stones.  Discussed proper diet and to continue pushing fluids.  Differential diagnosis, natural history, supportive care, and indications for immediate follow-up discussed.   Patient given instructions and understanding of medications and  treatment.    If not improving in 3-5 days, F/U with PCP or return to UC if symptoms worsen.  Strict ER precautions given.  Patient agreeable to plan.      Please note that this dictation was created using voice recognition software. I have made every reasonable attempt to correct obvious errors, but I expect that there are errors of grammar and possibly content that I did not discover before finalizing the note.    Josh Wing PA-C

## 2019-06-01 LAB
BACTERIA UR CULT: NORMAL
SIGNIFICANT IND 70042: NORMAL
SITE SITE: NORMAL
SOURCE SOURCE: NORMAL

## 2019-06-05 DIAGNOSIS — R10.84 GENERALIZED ABDOMINAL PAIN: ICD-10-CM

## 2019-06-07 ENCOUNTER — HOSPITAL ENCOUNTER (OUTPATIENT)
Dept: RADIOLOGY | Facility: MEDICAL CENTER | Age: 39
End: 2019-06-07
Attending: NURSE PRACTITIONER
Payer: COMMERCIAL

## 2019-06-07 DIAGNOSIS — R10.84 GENERALIZED ABDOMINAL PAIN: ICD-10-CM

## 2019-06-07 DIAGNOSIS — Z12.31 ENCOUNTER FOR SCREENING MAMMOGRAM FOR BREAST CANCER: ICD-10-CM

## 2019-06-07 PROCEDURE — 74176 CT ABD & PELVIS W/O CONTRAST: CPT

## 2019-06-07 PROCEDURE — 77063 BREAST TOMOSYNTHESIS BI: CPT

## 2019-06-10 ENCOUNTER — PATIENT MESSAGE (OUTPATIENT)
Dept: MEDICAL GROUP | Facility: PHYSICIAN GROUP | Age: 39
End: 2019-06-10

## 2019-06-11 NOTE — TELEPHONE ENCOUNTER
"From: Tiana Martinez  To: DANIEL GutierresPJonathanRBONITA  Sent: 6/10/2019 3:26 PM PDT  Subject: Test Result Question    Mikey Chun,  Do you know when Blanca will be back? And will she still receive my message?     Thank you,  Tiana        ----- Message -----  From: DANIEL GutierresPJonathanRBONITA  Sent: 6/10/19, 3:17 PM  To: Tiana Martinez  Subject: RE: Test Result Question    Blanca Boyd is out of the office. A CT is more diagnostic than an XR or US. The differential is vast. This could also possibly be cystitis which is irritation and inflammation not infection or stone, occasionally caused by certain foods like caffeine, alcohol, acidic foods. I agree with you, your best course of action is to follow up with your urologist.     THAO Renteria  [facility]         ----- Message -----   From: Tiana Martinez   Sent: 6/10/2019 10:18 AM PDT   To: BlancaSHAYLA ShahRBONITA  Subject: Test Result Question    Mikey Rios,   I'm increasingly uncomfortable tenderness and pain in my bladder/stomach area and lower back ache. Based on the CT test saying they are likely \"phleboliths\", I read that they don't cause pain. This doesn't match up to how I feel. I placed a call into Dr. Tyson's office last week, prior to the CT appt., and still have not heard back from them- not unusual for their office. :/ I will try them again.     Is there an US or CT w/ contrast that be done to get to the root cause? Given my history of stones, could the stone/s be irritating my bladder?     Thank you for your time,   Tiana"

## 2019-07-10 ENCOUNTER — HOSPITAL ENCOUNTER (OUTPATIENT)
Dept: RADIOLOGY | Facility: MEDICAL CENTER | Age: 39
End: 2019-07-10
Attending: NURSE PRACTITIONER
Payer: COMMERCIAL

## 2019-07-10 ENCOUNTER — OFFICE VISIT (OUTPATIENT)
Dept: URGENT CARE | Facility: PHYSICIAN GROUP | Age: 39
End: 2019-07-10
Payer: COMMERCIAL

## 2019-07-10 VITALS
BODY MASS INDEX: 25.62 KG/M2 | HEART RATE: 89 BPM | SYSTOLIC BLOOD PRESSURE: 132 MMHG | WEIGHT: 173 LBS | HEIGHT: 69 IN | OXYGEN SATURATION: 97 % | RESPIRATION RATE: 16 BRPM | TEMPERATURE: 99.2 F | DIASTOLIC BLOOD PRESSURE: 84 MMHG

## 2019-07-10 DIAGNOSIS — S96.911A STRAIN OF RIGHT FOOT, INITIAL ENCOUNTER: ICD-10-CM

## 2019-07-10 DIAGNOSIS — M79.671 ACUTE PAIN OF RIGHT FOOT: ICD-10-CM

## 2019-07-10 PROCEDURE — 73630 X-RAY EXAM OF FOOT: CPT | Mod: RT

## 2019-07-10 PROCEDURE — 99214 OFFICE O/P EST MOD 30 MIN: CPT | Performed by: NURSE PRACTITIONER

## 2019-07-11 NOTE — PROGRESS NOTES
"Subjective:      Tiana Martinez is a 39 y.o. female who presents with Ankle Pain (R ankle swollen and painful x 6days)            HPI  Acute right foot pain and swelling to top of foot x 4 days . Denies trauma, injury or fall. Recent high heel use 4 days ago at work and the  Foot began to have pain  but denies twisting ankle or misstep. Unknown cause for pain swelling. States went to [facility] today and was turned away. Denies numbness/tingling in foot. No radiating pain or swelling up leg. No previous injury to foot. Intermittent ice and foot elevation, admits to no NSAID use as she does not like to take medications.    PMH:  has a past medical history of Bronchitis (2009); DVT (deep venous thrombosis) ([facility]) (2015); Encounter for long-term (current) use of other medications; Fibroids (2017); Migraine; Pain (1/2017); Psychiatric problem; Renal disorder; Seizure ([facility]) (2013); Sepsis ([facility]) (4/2015); Sleep apnea with use of continuous positive airway pressure (CPAP) (10/12/2018); and Unspecified diffuse connective tissue disease.  MEDS:   Current Outpatient Prescriptions:   •  DULoxetine (CYMBALTA) 60 MG Cap DR Particles delayed-release capsule, Take 1 Cap by mouth every morning., Disp: 90 Cap, Rfl: 3  •  Cholecalciferol (VITAMIN D PO), Take 6,000 Units by mouth., Disp: , Rfl:   •  azithromycin (ZITHROMAX) 250 MG Tab, Take 2 tabs today, then 1 tab daily til completed. (Patient not taking: Reported on 5/7/2019), Disp: 6 Tab, Rfl: 0  •  Diclofenac Potassium 50 MG Pack, Take 50 mg by mouth as needed (Migraines)., Disp: 3 Each, Rfl: 1  ALLERGIES:   Allergies   Allergen Reactions   • Chloraprep One Step Rash and Itching     Pt gets severe weeping rash where antiseptic was used   • Codeine Vomiting   • Tea Tree Oil Shortness of Breath   • Tramadol      \"got dizzy, couldn't move\"   • Turmeric Anaphylaxis   • Mobic Hives   • Peppermint Oil Itching   • Other Misc      Adhesives including EKG stickers rash at " "contact site   • Senokot Rash     Had rash when taken   • Sulfa Drugs      Per allergy blood test   • Tape Rash     All tapes rash at contact site   • Tree Nuts Food Allergy              SURGHX:   Past Surgical History:   Procedure Laterality Date   • HYSTERECTOMY ROBOTIC SINGLE SITE  2/6/2017    Procedure: HYSTERECTOMY ROBOTIC SINGLE SITE BILATERAL SALPINGECTOMY ;  Surgeon: Zaynab Dailey M.D.;  Location: SURGERY [facility];  Service:    • CYSTOSCOPY  2/6/2017    Procedure: CYSTOSCOPY;  Surgeon: Zaynab Dailey M.D.;  Location: SURGERY [facility];  Service:      SOCHX:  reports that she has never smoked. She has never used smokeless tobacco. She reports that she drinks alcohol. She reports that she does not use drugs.  FH: Family history was reviewed, no pertinent findings to report    Review of Systems   Constitutional: Negative for chills, fever and malaise/fatigue.   Cardiovascular: Negative for leg swelling.   Musculoskeletal: Positive for joint pain and myalgias. Negative for falls.   Skin: Negative for itching and rash.   Neurological: Negative for tingling, sensory change and weakness.   Endo/Heme/Allergies: Does not bruise/bleed easily.   All other systems reviewed and are negative.         Objective:     /84 (BP Location: Left arm, Patient Position: Sitting, BP Cuff Size: Adult)   Pulse 89   Temp 37.3 °C (99.2 °F) (Temporal)   Resp 16   Ht 1.753 m (5' 9\")   Wt 78.5 kg (173 lb)   LMP 01/29/2017 (Exact Date)   SpO2 97%   BMI 25.55 kg/m²      Physical Exam   Constitutional: She is oriented to person, place, and time. She appears well-developed and well-nourished. She is active and cooperative.  Non-toxic appearance. She does not have a sickly appearance. She does not appear ill. No distress.   HENT:   Head: Normocephalic.   Eyes: Pupils are equal, round, and reactive to light. EOM are normal.   Cardiovascular: Normal rate.    Pulmonary/Chest: Effort normal.   Musculoskeletal: " She exhibits edema and tenderness. She exhibits no deformity.        Right foot: There is tenderness, bony tenderness and swelling. There is normal range of motion, normal capillary refill, no crepitus, no deformity and no laceration.        Feet:    Mild swelling to dorsum of right foot at 1st-3rd metatarsals. TTP at site, no heat to skin. Mild bruising to big toe at phalangeal metatarsal junction. No bunion seen. Mild redness along distal aspect of the foot arch without abrasion or other signs of trauma. TTP at medial aspect of arch. Mild antalgic gait. No ankle or distal RLE swelling.   Feet:   Right Foot:   Protective Sensation: 10 sites tested. 10 sites sensed.   Skin Integrity: Negative for ulcer, blister, skin breakdown, erythema, warmth, callus or dry skin.   Neurological: She is alert and oriented to person, place, and time.   Skin: Skin is warm and dry. No rash noted. She is not diaphoretic. No erythema.   Vitals reviewed.         Foot xray: FINDINGS:  No acute fracture or dislocation identified. Soft tissue swelling.  No cortical bone destruction or periosteal reaction identified     MA applied walking boot/ace wrap- patient to use prn for level of pain  Assessment/Plan:     1. Acute pain of right foot    - DX-FOOT-COMPLETE 3+ RIGHT; Future  - REFERRAL TO SPORTS MEDICINE    2. Strain of right foot, initial encounter    - REFERRAL TO SPORTS MEDICINE    May use NSAID prn for pain/swelling  May use cool compresses for swelling prn  May utilize RICE method prn  Avoid excessive weight bearing to avoid further injury  May apply topical analgesics prn  Perform proper body mechanics with lifting, twisting, bending and walking  Monitor for deformity, numbness/tingling in toes, decreased ROM with weight bearing- need re-evaluation  F/u Sports Med

## 2019-07-12 ASSESSMENT — ENCOUNTER SYMPTOMS
FEVER: 0
MYALGIAS: 1
FALLS: 0
TINGLING: 0
WEAKNESS: 0
BRUISES/BLEEDS EASILY: 0
CHILLS: 0
SENSORY CHANGE: 0

## 2019-09-03 DIAGNOSIS — G43.009 MIGRAINE WITHOUT AURA AND WITHOUT STATUS MIGRAINOSUS, NOT INTRACTABLE: ICD-10-CM

## 2019-09-03 RX ORDER — DICLOFENAC POTASSIUM 50 MG/1
50 POWDER, FOR SOLUTION ORAL
Qty: 3 EACH | Refills: 11 | Status: SHIPPED | OUTPATIENT
Start: 2019-09-03 | End: 2021-08-19

## 2019-09-03 RX ORDER — DICLOFENAC POTASSIUM 50 MG/1
TABLET, FILM COATED ORAL
Qty: 60 TAB | Refills: 0 | OUTPATIENT
Start: 2019-09-03

## 2019-09-03 NOTE — TELEPHONE ENCOUNTER
Was the patient seen in the last year in this department? Yes LOV 10/10/18    Does patient have an active prescription for medications requested? No     Received Request Via: Pharmacy

## 2019-09-04 NOTE — PROGRESS NOTES
Requested Prescriptions     Signed Prescriptions Disp Refills   • Diclofenac Potassium 50 MG Pack 3 Each 11     Sig: Take 50 mg by mouth 1 time daily as needed (Migraines).       JODY Hayes.

## 2019-10-24 ENCOUNTER — APPOINTMENT (RX ONLY)
Dept: URBAN - METROPOLITAN AREA CLINIC 31 | Facility: CLINIC | Age: 39
Setting detail: DERMATOLOGY
End: 2019-10-24

## 2019-10-24 DIAGNOSIS — L81.4 OTHER MELANIN HYPERPIGMENTATION: ICD-10-CM

## 2019-10-24 DIAGNOSIS — Z71.89 OTHER SPECIFIED COUNSELING: ICD-10-CM

## 2019-10-24 DIAGNOSIS — L81.8 OTHER SPECIFIED DISORDERS OF PIGMENTATION: ICD-10-CM

## 2019-10-24 DIAGNOSIS — D18.0 HEMANGIOMA: ICD-10-CM

## 2019-10-24 DIAGNOSIS — D22 MELANOCYTIC NEVI: ICD-10-CM

## 2019-10-24 PROBLEM — D22.5 MELANOCYTIC NEVI OF TRUNK: Status: ACTIVE | Noted: 2019-10-24

## 2019-10-24 PROBLEM — D18.01 HEMANGIOMA OF SKIN AND SUBCUTANEOUS TISSUE: Status: ACTIVE | Noted: 2019-10-24

## 2019-10-24 PROBLEM — D48.5 NEOPLASM OF UNCERTAIN BEHAVIOR OF SKIN: Status: ACTIVE | Noted: 2019-10-24

## 2019-10-24 PROCEDURE — 99203 OFFICE O/P NEW LOW 30 MIN: CPT | Mod: 25

## 2019-10-24 PROCEDURE — ? COUNSELING

## 2019-10-24 PROCEDURE — ? ADDITIONAL NOTES

## 2019-10-24 PROCEDURE — 11102 TANGNTL BX SKIN SINGLE LES: CPT

## 2019-10-24 PROCEDURE — ? BIOPSY BY SHAVE METHOD

## 2019-10-24 ASSESSMENT — LOCATION ZONE DERM
LOCATION ZONE: HAND
LOCATION ZONE: TRUNK

## 2019-10-24 ASSESSMENT — LOCATION DETAILED DESCRIPTION DERM
LOCATION DETAILED: RIGHT MEDIAL BREAST 2-3:00 REGION
LOCATION DETAILED: LEFT RIB CAGE
LOCATION DETAILED: RIGHT ULNAR PALM
LOCATION DETAILED: EPIGASTRIC SKIN

## 2019-10-24 ASSESSMENT — LOCATION SIMPLE DESCRIPTION DERM
LOCATION SIMPLE: ABDOMEN
LOCATION SIMPLE: RIGHT BREAST
LOCATION SIMPLE: RIGHT HAND

## 2019-10-24 NOTE — PROCEDURE: ADDITIONAL NOTES
Detail Level: Simple
Additional Notes: we discussed risk for scar and keloid formation especially on the chest,  pt still prefers removal.

## 2019-10-24 NOTE — PROCEDURE: BIOPSY BY SHAVE METHOD
Electrodesiccation Text: The wound bed was treated with electrodesiccation after the biopsy was performed.
Consent: Written consent was obtained and risks were reviewed including but not limited to scarring, infection, bleeding, scabbing, incomplete removal, nerve damage and allergy to anesthesia.
Type Of Destruction Used: Curettage
Anesthesia Volume In Cc: 0.5
Was A Bandage Applied: Yes
Electrodesiccation And Curettage Text: The wound bed was treated with electrodesiccation and curettage after the biopsy was performed.
Biopsy Method: Personna blade
Dressing: bandage
Silver Nitrate Text: The wound bed was treated with silver nitrate after the biopsy was performed.
Detail Level: Detailed
Render In Bullet Format When Appropriate: No
Hemostasis: Drysol and Electrocautery
Billing Type: Third-Party Bill
Lab: [id number]
Size Of Lesion In Cm: 0.3
Curettage Text: The wound bed was treated with curettage after the biopsy was performed.
Post-Care Instructions: I reviewed with the patient in detail post-care instructions. Patient is to keep the biopsy site dry overnight, and then apply vaseline twice daily until healed.
Cryotherapy Text: The wound bed was treated with cryotherapy after the biopsy was performed.
Anesthesia Type: 1% lidocaine with epinephrine
Depth Of Biopsy: dermis
X Size Of Lesion In Cm: 0
Lab Facility: 
Biopsy Type: H and E
Wound Care: Vaseline
Notification Instructions: Patient will be notified of biopsy results. However, patient instructed to call the office if not contacted within 2 weeks.

## 2019-10-24 NOTE — HPI: SKIN LESION
Is This A New Presentation, Or A Follow-Up?: Mole
How Severe Is Your Skin Lesion?: mild
Has Your Skin Lesion Been Treated?: not been treated
Additional History: Patient states she wants spot removed .  She states it burns if out in the sun.

## 2020-02-05 NOTE — OR NURSING
Pt continues to be tachy with , SOB and pain under breasts, relieved some when laying down on CT table 02 sat maintaining 95% on 2 liters nasal canula    Cosentyx Counseling:  I discussed with the patient the risks of Cosentyx including but not limited to worsening of Crohn's disease, immunosuppression, allergic reactions and infections.  The patient understands that monitoring is required including a PPD at baseline and must alert us or the primary physician if symptoms of infection or other concerning signs are noted.

## 2020-06-30 DIAGNOSIS — F33.0 MILD EPISODE OF RECURRENT MAJOR DEPRESSIVE DISORDER (HCC): ICD-10-CM

## 2020-06-30 RX ORDER — DULOXETIN HYDROCHLORIDE 60 MG/1
60 CAPSULE, DELAYED RELEASE ORAL EVERY MORNING
Qty: 90 CAP | Refills: 0 | Status: SHIPPED | OUTPATIENT
Start: 2020-06-30 | End: 2021-09-02

## 2020-06-30 NOTE — TELEPHONE ENCOUNTER
Received request via: Pharmacy    Was the patient seen in the last year in this department? No 10/10/18    Does the patient have an active prescription (recently filled or refills available) for medication(s) requested? No

## 2020-06-30 NOTE — TELEPHONE ENCOUNTER
Requested Prescriptions     Pending Prescriptions Disp Refills   • DULoxetine (CYMBALTA) 60 MG Cap DR Particles delayed-release capsule 90 Cap 0     Sig: Take 1 Cap by mouth every morning.       JODY Hayes.

## 2020-08-12 ENCOUNTER — HOSPITAL ENCOUNTER (OUTPATIENT)
Dept: LAB | Facility: MEDICAL CENTER | Age: 40
End: 2020-08-12
Attending: NURSE PRACTITIONER
Payer: COMMERCIAL

## 2020-08-12 LAB
ESTRADIOL SERPL-MCNC: 56.7 PG/ML
FSH SERPL-ACNC: 7.2 MIU/ML
LH SERPL-ACNC: 15 IU/L
PROGEST SERPL-MCNC: 1.74 NG/ML
TSH SERPL DL<=0.005 MIU/L-ACNC: 1.43 UIU/ML (ref 0.38–5.33)

## 2020-08-12 PROCEDURE — 36415 COLL VENOUS BLD VENIPUNCTURE: CPT

## 2020-08-12 PROCEDURE — 82670 ASSAY OF TOTAL ESTRADIOL: CPT

## 2020-08-12 PROCEDURE — 84144 ASSAY OF PROGESTERONE: CPT

## 2020-08-12 PROCEDURE — 83002 ASSAY OF GONADOTROPIN (LH): CPT

## 2020-08-12 PROCEDURE — 83001 ASSAY OF GONADOTROPIN (FSH): CPT

## 2020-08-12 PROCEDURE — 84443 ASSAY THYROID STIM HORMONE: CPT

## 2021-04-21 ENCOUNTER — APPOINTMENT (OUTPATIENT)
Dept: RADIOLOGY | Facility: MEDICAL CENTER | Age: 41
End: 2021-04-21
Attending: NURSE PRACTITIONER
Payer: COMMERCIAL

## 2021-05-14 ENCOUNTER — APPOINTMENT (OUTPATIENT)
Dept: RADIOLOGY | Facility: MEDICAL CENTER | Age: 41
End: 2021-05-14
Attending: NURSE PRACTITIONER
Payer: COMMERCIAL

## 2021-05-14 DIAGNOSIS — Z12.31 VISIT FOR SCREENING MAMMOGRAM: ICD-10-CM

## 2021-05-14 PROCEDURE — 77063 BREAST TOMOSYNTHESIS BI: CPT

## 2021-07-03 ENCOUNTER — HOSPITAL ENCOUNTER (OUTPATIENT)
Dept: LAB | Facility: MEDICAL CENTER | Age: 41
End: 2021-07-03
Attending: NURSE PRACTITIONER
Payer: COMMERCIAL

## 2021-07-03 LAB
ALBUMIN SERPL BCP-MCNC: 4.3 G/DL (ref 3.2–4.9)
ALBUMIN/GLOB SERPL: 1.9 G/DL
ALP SERPL-CCNC: 79 U/L (ref 30–99)
ALT SERPL-CCNC: 14 U/L (ref 2–50)
ANION GAP SERPL CALC-SCNC: 11 MMOL/L (ref 7–16)
AST SERPL-CCNC: 19 U/L (ref 12–45)
BASOPHILS # BLD AUTO: 0.5 % (ref 0–1.8)
BASOPHILS # BLD: 0.04 K/UL (ref 0–0.12)
BILIRUB SERPL-MCNC: 0.5 MG/DL (ref 0.1–1.5)
BUN SERPL-MCNC: 11 MG/DL (ref 8–22)
CALCIUM SERPL-MCNC: 9.1 MG/DL (ref 8.5–10.5)
CHLORIDE SERPL-SCNC: 103 MMOL/L (ref 96–112)
CHOLEST SERPL-MCNC: 210 MG/DL (ref 100–199)
CO2 SERPL-SCNC: 24 MMOL/L (ref 20–33)
CREAT SERPL-MCNC: 0.65 MG/DL (ref 0.5–1.4)
EOSINOPHIL # BLD AUTO: 0.48 K/UL (ref 0–0.51)
EOSINOPHIL NFR BLD: 6.4 % (ref 0–6.9)
ERYTHROCYTE [DISTWIDTH] IN BLOOD BY AUTOMATED COUNT: 44.1 FL (ref 35.9–50)
FASTING STATUS PATIENT QL REPORTED: NORMAL
FSH SERPL-ACNC: 5.8 MIU/ML
GLOBULIN SER CALC-MCNC: 2.3 G/DL (ref 1.9–3.5)
GLUCOSE SERPL-MCNC: 90 MG/DL (ref 65–99)
HCT VFR BLD AUTO: 45.3 % (ref 37–47)
HDLC SERPL-MCNC: 53 MG/DL
HGB BLD-MCNC: 15.1 G/DL (ref 12–16)
IMM GRANULOCYTES # BLD AUTO: 0.03 K/UL (ref 0–0.11)
IMM GRANULOCYTES NFR BLD AUTO: 0.4 % (ref 0–0.9)
LDLC SERPL CALC-MCNC: 125 MG/DL
LYMPHOCYTES # BLD AUTO: 2.19 K/UL (ref 1–4.8)
LYMPHOCYTES NFR BLD: 29 % (ref 22–41)
MCH RBC QN AUTO: 33.1 PG (ref 27–33)
MCHC RBC AUTO-ENTMCNC: 33.3 G/DL (ref 33.6–35)
MCV RBC AUTO: 99.3 FL (ref 81.4–97.8)
MONOCYTES # BLD AUTO: 0.58 K/UL (ref 0–0.85)
MONOCYTES NFR BLD AUTO: 7.7 % (ref 0–13.4)
NEUTROPHILS # BLD AUTO: 4.23 K/UL (ref 2–7.15)
NEUTROPHILS NFR BLD: 56 % (ref 44–72)
NRBC # BLD AUTO: 0 K/UL
NRBC BLD-RTO: 0 /100 WBC
PLATELET # BLD AUTO: 277 K/UL (ref 164–446)
PMV BLD AUTO: 10.8 FL (ref 9–12.9)
POTASSIUM SERPL-SCNC: 4.3 MMOL/L (ref 3.6–5.5)
PROT SERPL-MCNC: 6.6 G/DL (ref 6–8.2)
RBC # BLD AUTO: 4.56 M/UL (ref 4.2–5.4)
SODIUM SERPL-SCNC: 138 MMOL/L (ref 135–145)
T4 FREE SERPL-MCNC: 0.83 NG/DL (ref 0.93–1.7)
TRIGL SERPL-MCNC: 159 MG/DL (ref 0–149)
TSH SERPL DL<=0.005 MIU/L-ACNC: 1.62 UIU/ML (ref 0.38–5.33)
WBC # BLD AUTO: 7.6 K/UL (ref 4.8–10.8)

## 2021-07-03 PROCEDURE — 82670 ASSAY OF TOTAL ESTRADIOL: CPT

## 2021-07-03 PROCEDURE — 82607 VITAMIN B-12: CPT

## 2021-07-03 PROCEDURE — 36415 COLL VENOUS BLD VENIPUNCTURE: CPT

## 2021-07-03 PROCEDURE — 80061 LIPID PANEL: CPT

## 2021-07-03 PROCEDURE — 80053 COMPREHEN METABOLIC PANEL: CPT

## 2021-07-03 PROCEDURE — 87040 BLOOD CULTURE FOR BACTERIA: CPT

## 2021-07-03 PROCEDURE — 82306 VITAMIN D 25 HYDROXY: CPT

## 2021-07-03 PROCEDURE — 83001 ASSAY OF GONADOTROPIN (FSH): CPT

## 2021-07-03 PROCEDURE — 85025 COMPLETE CBC W/AUTO DIFF WBC: CPT

## 2021-07-03 PROCEDURE — 84443 ASSAY THYROID STIM HORMONE: CPT | Mod: 91

## 2021-07-03 PROCEDURE — 84443 ASSAY THYROID STIM HORMONE: CPT

## 2021-07-03 PROCEDURE — 84439 ASSAY OF FREE THYROXINE: CPT

## 2021-07-03 PROCEDURE — 84439 ASSAY OF FREE THYROXINE: CPT | Mod: 91

## 2021-07-06 LAB
25(OH)D3 SERPL-MCNC: 117 NG/ML (ref 30–100)
ESTRADIOL SERPL-MCNC: 362 PG/ML
T4 FREE SERPL-MCNC: 0.84 NG/DL (ref 0.93–1.7)
TSH SERPL DL<=0.005 MIU/L-ACNC: 1.73 UIU/ML (ref 0.38–5.33)
VIT B12 SERPL-MCNC: 781 PG/ML (ref 211–911)

## 2021-07-08 LAB
BACTERIA BLD CULT: NORMAL
BACTERIA BLD CULT: NORMAL
SIGNIFICANT IND 70042: NORMAL
SIGNIFICANT IND 70042: NORMAL
SITE SITE: NORMAL
SITE SITE: NORMAL
SOURCE SOURCE: NORMAL
SOURCE SOURCE: NORMAL

## 2021-08-06 ENCOUNTER — TELEPHONE (OUTPATIENT)
Dept: SCHEDULING | Facility: IMAGING CENTER | Age: 41
End: 2021-08-06

## 2021-08-16 ENCOUNTER — HOSPITAL ENCOUNTER (OUTPATIENT)
Dept: LAB | Facility: MEDICAL CENTER | Age: 41
End: 2021-08-16
Attending: NURSE PRACTITIONER
Payer: COMMERCIAL

## 2021-08-16 LAB
T3FREE SERPL-MCNC: 2.62 PG/ML (ref 2–4.4)
T4 FREE SERPL-MCNC: 0.86 NG/DL (ref 0.93–1.7)
TSH SERPL DL<=0.005 MIU/L-ACNC: 2.29 UIU/ML (ref 0.38–5.33)

## 2021-08-16 PROCEDURE — 84481 FREE ASSAY (FT-3): CPT

## 2021-08-16 PROCEDURE — 84443 ASSAY THYROID STIM HORMONE: CPT

## 2021-08-16 PROCEDURE — 86376 MICROSOMAL ANTIBODY EACH: CPT

## 2021-08-16 PROCEDURE — 84439 ASSAY OF FREE THYROXINE: CPT

## 2021-08-16 PROCEDURE — 36415 COLL VENOUS BLD VENIPUNCTURE: CPT

## 2021-08-18 LAB — THYROPEROXIDASE AB SERPL-ACNC: <0.3 IU/ML (ref 0–9)

## 2021-08-19 ENCOUNTER — OFFICE VISIT (OUTPATIENT)
Dept: MEDICAL GROUP | Facility: MEDICAL CENTER | Age: 41
End: 2021-08-19
Payer: COMMERCIAL

## 2021-08-19 VITALS
HEIGHT: 69 IN | SYSTOLIC BLOOD PRESSURE: 132 MMHG | TEMPERATURE: 97 F | OXYGEN SATURATION: 98 % | BODY MASS INDEX: 25.42 KG/M2 | HEART RATE: 88 BPM | DIASTOLIC BLOOD PRESSURE: 88 MMHG | WEIGHT: 171.6 LBS | RESPIRATION RATE: 16 BRPM

## 2021-08-19 DIAGNOSIS — G47.30 SLEEP APNEA WITH USE OF CONTINUOUS POSITIVE AIRWAY PRESSURE (CPAP): ICD-10-CM

## 2021-08-19 DIAGNOSIS — G43.009 MIGRAINE WITHOUT AURA AND WITHOUT STATUS MIGRAINOSUS, NOT INTRACTABLE: ICD-10-CM

## 2021-08-19 DIAGNOSIS — R53.82 CHRONIC FATIGUE: ICD-10-CM

## 2021-08-19 DIAGNOSIS — F17.210 CIGARETTE SMOKER: ICD-10-CM

## 2021-08-19 DIAGNOSIS — K58.0 IRRITABLE BOWEL SYNDROME WITH DIARRHEA: ICD-10-CM

## 2021-08-19 DIAGNOSIS — F32.A ANXIETY AND DEPRESSION: ICD-10-CM

## 2021-08-19 DIAGNOSIS — N20.0 KIDNEY STONES, CALCIUM OXALATE: ICD-10-CM

## 2021-08-19 DIAGNOSIS — M35.9 AUTOIMMUNE DISEASE (HCC): ICD-10-CM

## 2021-08-19 DIAGNOSIS — M79.2 NEURALGIA: ICD-10-CM

## 2021-08-19 DIAGNOSIS — F33.0 MILD EPISODE OF RECURRENT MAJOR DEPRESSIVE DISORDER (HCC): ICD-10-CM

## 2021-08-19 DIAGNOSIS — F41.9 ANXIETY AND DEPRESSION: ICD-10-CM

## 2021-08-19 DIAGNOSIS — R79.89 LOW T4: ICD-10-CM

## 2021-08-19 PROBLEM — K21.9 GASTROESOPHAGEAL REFLUX DISEASE: Status: ACTIVE | Noted: 2021-08-19

## 2021-08-19 PROBLEM — G93.32 CHRONIC FATIGUE SYNDROME: Status: ACTIVE | Noted: 2021-08-19

## 2021-08-19 PROBLEM — K58.9 IRRITABLE BOWEL SYNDROME: Status: ACTIVE | Noted: 2021-08-19

## 2021-08-19 PROBLEM — F41.1 ANXIETY STATE: Status: ACTIVE | Noted: 2021-08-19

## 2021-08-19 PROBLEM — G93.32 CHRONIC FATIGUE SYNDROME: Status: RESOLVED | Noted: 2021-08-19 | Resolved: 2021-08-19

## 2021-08-19 PROCEDURE — 99214 OFFICE O/P EST MOD 30 MIN: CPT | Performed by: PHYSICIAN ASSISTANT

## 2021-08-19 RX ORDER — ESTRADIOL 10 UG/1
INSERT VAGINAL
COMMUNITY
End: 2021-08-19

## 2021-08-19 ASSESSMENT — PATIENT HEALTH QUESTIONNAIRE - PHQ9: CLINICAL INTERPRETATION OF PHQ2 SCORE: 0

## 2021-08-19 ASSESSMENT — ANXIETY QUESTIONNAIRES
1. FEELING NERVOUS, ANXIOUS, OR ON EDGE: NOT AT ALL
4. TROUBLE RELAXING: NEARLY EVERY DAY
IF YOU CHECKED OFF ANY PROBLEMS ON THIS QUESTIONNAIRE, HOW DIFFICULT HAVE THESE PROBLEMS MADE IT FOR YOU TO DO YOUR WORK, TAKE CARE OF THINGS AT HOME, OR GET ALONG WITH OTHER PEOPLE: NOT DIFFICULT AT ALL
GAD7 TOTAL SCORE: 3
3. WORRYING TOO MUCH ABOUT DIFFERENT THINGS: NOT AT ALL
6. BECOMING EASILY ANNOYED OR IRRITABLE: NOT AT ALL
5. BEING SO RESTLESS THAT IT IS HARD TO SIT STILL: NOT AT ALL
2. NOT BEING ABLE TO STOP OR CONTROL WORRYING: NOT AT ALL
7. FEELING AFRAID AS IF SOMETHING AWFUL MIGHT HAPPEN: NOT AT ALL

## 2021-08-19 ASSESSMENT — FIBROSIS 4 INDEX: FIB4 SCORE: 0.79

## 2021-08-19 NOTE — ASSESSMENT & PLAN NOTE
Does have bilat kidney stones, the one on the right is a little bigger, gets a KUB yearly. Tried to eat a low oxylate diet and drink plenty of water. Sees urology in [place] yearly

## 2021-08-19 NOTE — ASSESSMENT & PLAN NOTE
Mid 30s. Taking methotrexate and prednisone. Was seeing Dr. Hadley Valencia, rheumatology. Gained 30 pounds. Was so bad that she almost had to file for disability. Did an alternative treatment which seemed to help, pushing her into remission. Mixed connective tissue disease. achey joints, fatigue, carotid artery pain, chest pain, migraines, raynaud's

## 2021-08-19 NOTE — ASSESSMENT & PLAN NOTE
Occasional, maybe once every 3 months, seems to be related to wine intake, has a rescue med to take when she needs it, diclofenac

## 2021-08-19 NOTE — LETTER
[facility]  Ally Post P.A.-C.  [address]  [place] [address]  Fax: [phone]   Authorization for Release/Disclosure of   Protected Health Information   Name: TIANA HONG : 1980 SSN: [id number]   Address: [address] [place] [address] [address] [place] [address] Phone:    [phone] (home) [phone] (work)   I authorize the entity listed below to release/disclose the PHI below to:   [facility]/Ally Post P.A.-C. and Ally Post P.A.-C[facility]   Provider or Entity Name:     Address   City, State, Zip   Phone:      Fax:     Reason for request: continuity of care   Information to be released:    [  ] LAST COLONOSCOPY,  including any PATH REPORT and follow-up  [  ] LAST FIT/COLOGUARD RESULT [  ] LAST DEXA  [  ] LAST MAMMOGRAM  [  ] LAST PAP  [  ] LAST LABS [  ] RETINA EXAM REPORT  [  ] IMMUNIZATION RECORDS  [  ] Release all info      [  ] Check here and initial the line next to each item to release ALL health information INCLUDING  _____ Care and treatment for drug and / or alcohol abuse  _____ HIV testing, infection status, or AIDS  _____ Genetic Testing    DATES OF SERVICE OR TIME PERIOD TO BE DISCLOSED: _____________  I understand and acknowledge that:  * This Authorization may be revoked at any time by you in writing, except if your health information has already been used or disclosed.  * Your health information that will be used or disclosed as a result of you signing this authorization could be re-disclosed by the recipient. If this occurs, your re-disclosed health information may no longer be protected by State or Federal laws.  * You may refuse to sign this Authorization. Your refusal will not affect your ability to obtain treatment.  * This Authorization becomes effective upon signing and will  on (date) __________.      If no date is indicated, this Authorization will  one (1) year from the signature date.    Name: Tiana Coffman  John    Signature:   Date:     8/19/2021       PLEASE FAX REQUESTED RECORDS BACK TO: [phone]

## 2021-08-19 NOTE — ASSESSMENT & PLAN NOTE
Started on duloxetine when she had the connective tissue disease flare for nerve pain. Not having that pain but feels mood is balanced with the duloxetine. Worried that the duloxetine is affecting her libido and weight. Would like to switch to wellbutrin.

## 2021-08-19 NOTE — LETTER
[facility]  Ally Post P.A.-C.  [address]  [place] [address]  Fax: [phone]   Authorization for Release/Disclosure of   Protected Health Information   Name: TIANA HONG : 1980 SSN: [id number]   Address: [address] [place] [address] [address] [place] [address] Phone:    [phone] (home) [phone] (work)   I authorize the entity listed below to release/disclose the PHI below to:   [facility]/Ally Post P.A.-C. and Ally Post P.A.-C.   Provider or Entity Name:     Address   City, State, Zip   Phone:      Fax:     Reason for request: continuity of care   Information to be released:    [  ] LAST COLONOSCOPY,  including any PATH REPORT and follow-up  [  ] LAST FIT/COLOGUARD RESULT [  ] LAST DEXA  [  ] LAST MAMMOGRAM  [  ] LAST PAP  [  ] LAST LABS [  ] RETINA EXAM REPORT  [  ] IMMUNIZATION RECORDS  [  ] Release all info      [  ] Check here and initial the line next to each item to release ALL health information INCLUDING  _____ Care and treatment for drug and / or alcohol abuse  _____ HIV testing, infection status, or AIDS  _____ Genetic Testing    DATES OF SERVICE OR TIME PERIOD TO BE DISCLOSED: _____________  I understand and acknowledge that:  * This Authorization may be revoked at any time by you in writing, except if your health information has already been used or disclosed.  * Your health information that will be used or disclosed as a result of you signing this authorization could be re-disclosed by the recipient. If this occurs, your re-disclosed health information may no longer be protected by State or Federal laws.  * You may refuse to sign this Authorization. Your refusal will not affect your ability to obtain treatment.  * This Authorization becomes effective upon signing and will  on (date) __________.      If no date is indicated, this Authorization will  one (1) year from the signature date.    Name: Tiana Coffman  John    Signature:   Date:     8/19/2021       PLEASE FAX REQUESTED RECORDS BACK TO: [phone]

## 2021-08-19 NOTE — LETTER
[facility]  [facility]  [address]  [place] [address]  Fax: [phone]   Authorization for Release/Disclosure of   Protected Health Information   Name: TIANA HONG : 1980 SSN: [id number]   Address: [address] [place] [address] [address] [place] [address] Phone:    [phone] (home) [phone] (work)   I authorize the entity listed below to release/disclose the PHI below to:   [facility] and [facility]   Provider or Entity Name:     Address   City, State, Zip   Phone:      Fax:     Reason for request: continuity of care   Information to be released:    [  ] LAST COLONOSCOPY,  including any PATH REPORT and follow-up  [  ] LAST FIT/COLOGUARD RESULT [  ] LAST DEXA  [  ] LAST MAMMOGRAM  [  ] LAST PAP  [  ] LAST LABS [  ] RETINA EXAM REPORT  [  ] IMMUNIZATION RECORDS  [  ] Release all info      [  ] Check here and initial the line next to each item to release ALL health information INCLUDING  _____ Care and treatment for drug and / or alcohol abuse  _____ HIV testing, infection status, or AIDS  _____ Genetic Testing    DATES OF SERVICE OR TIME PERIOD TO BE DISCLOSED: _____________  I understand and acknowledge that:  * This Authorization may be revoked at any time by you in writing, except if your health information has already been used or disclosed.  * Your health information that will be used or disclosed as a result of you signing this authorization could be re-disclosed by the recipient. If this occurs, your re-disclosed health information may no longer be protected by State or Federal laws.  * You may refuse to sign this Authorization. Your refusal will not affect your ability to obtain treatment.  * This Authorization becomes effective upon signing and will  on (date) __________.      If no date is indicated, this Authorization will  one (1) year from the signature date.    Name: Tiana Coffman  John    Signature:   Date:     8/19/2021       PLEASE FAX REQUESTED RECORDS BACK TO: [phone]

## 2021-08-20 RX ORDER — BUPROPION HYDROCHLORIDE 75 MG/1
75 TABLET ORAL 2 TIMES DAILY
Qty: 60 TABLET | Refills: 0 | Status: SHIPPED | OUTPATIENT
Start: 2021-08-20 | End: 2021-09-13

## 2021-08-20 RX ORDER — DULOXETIN HYDROCHLORIDE 20 MG/1
40 CAPSULE, DELAYED RELEASE ORAL DAILY
Qty: 60 CAPSULE | Refills: 0 | Status: SHIPPED | OUTPATIENT
Start: 2021-08-20 | End: 2021-09-13

## 2021-08-20 NOTE — ASSESSMENT & PLAN NOTE
Chronic, stable but would like to switch to wellbutrin as the duloxetine is causing low libido and weight gain

## 2021-08-20 NOTE — PROGRESS NOTES
Subjective:   Tiana Lopez is a 41 y.o. female here today to establish care. Works at the [employer].    Autoimmune disease (HCC)  Mid 30s. Taking methotrexate and prednisone. Was seeing Dr. Hadley Valencia, rheumatology. Gained 30 pounds. Was so bad that she almost had to file for disability. Did an alternative treatment which seemed to help, pushing her into remission. Mixed connective tissue disease. achey joints, fatigue, carotid artery pain, chest pain, migraines, raynaud's    Chronic fatigue  About a year. Worried about a thyroid problem    Sleep apnea with use of continuous positive airway pressure (CPAP)  Still on CPAP, DEANGELO found after hysterectomy, still using currently    Migraine without aura or status migrainosus  Occasional, maybe once every 3 months, seems to be related to wine intake, has a rescue med to take when she needs it, diclofenac    Kidney stones, calcium oxalate  Does have bilat kidney stones, the one on the right is a little bigger, gets a KUB yearly. Tried to eat a low oxylate diet and drink plenty of water. Sees urology in [place] yearly    Irritable bowel syndrome  Certain foods irritate stomach - cramping and diarrhea, not overly bothersome    Neuralgia  Started on duloxetine when she had the connective tissue disease flare for nerve pain. Not having that pain but feels mood is balanced with the duloxetine. Worried that the duloxetine is affecting her libido and weight. Would like to switch to wellbutrin.    Mild episode of recurrent major depressive disorder (HCC)  Chronic, stable but would like to switch to wellbutrin as the duloxetine is causing low libido and weight gain    Cigarette smoker  Started during covid, about a pack per week       Current medicines (including changes today)  Current Outpatient Medications   Medication Sig Dispense Refill   • DULoxetine (CYMBALTA) 20 MG Cap DR Particles Take 2 Capsules by mouth every day for 30 days. 60 Capsule 0   •  "buPROPion (WELLBUTRIN) 75 MG Tab Take 1 Tablet by mouth 2 times a day. 60 Tablet 0   • Multiple Vitamin (MULTI-VITAMIN DAILY PO) Take  by mouth.     • Non Formulary Request collidial silver- Taking 17 sprays sublingual daily.     • VITAMIN D PO Place  under the tongue.     • DULoxetine (CYMBALTA) 60 MG Cap DR Particles delayed-release capsule Take 1 Cap by mouth every morning. 90 Cap 0     No current facility-administered medications for this visit.     She  has a past medical history of Bronchitis (2009), DVT (deep venous thrombosis) ([facility]) (2015), Encounter for long-term (current) use of other medications, Fibroids (2017), Migraine, Pain (1/2017), Psychiatric problem, Renal disorder, Seizure ([facility]) (2013), Sepsis ([facility]) (4/2015), Sleep apnea with use of continuous positive airway pressure (CPAP) (10/12/2018), and Unspecified diffuse connective tissue disease.    ROS   No fever/chills. No weight change. No headache/dizziness. No focal weakness. No sore throat, nasal congestion, ear pain. No chest pain, no shortness of breath, difficulty breathing. No n/v/d/c or abdominal pain. No urinary complaint. No rash or skin lesion. No joint pain or swelling.       Objective:     /88 (BP Location: Left arm, Patient Position: Sitting)   Pulse 88   Temp 36.1 °C (97 °F) (Temporal)   Resp 16   Ht 1.753 m (5' 9\")   Wt 77.8 kg (171 lb 9.6 oz)   SpO2 98%  Body mass index is 25.34 kg/m².   Physical Exam:  Constitutional: WDWN, NAD  Skin: Warm, dry, good turgor, no rashes in visible areas.  Respiratory: Unlabored respiratory effort, lungs clear to auscultation, no wheezes, no ronchi.  Cardiovascular: Normal S1, S2, no murmur, no leg edema.  Psych: Alert and oriented x3, normal affect and mood.    Assessment and Plan:   The following treatment plan was discussed    1. Autoimmune disease ([facility])      2. Cigarette smoker      3. Chronic fatigue      4. Sleep apnea with use of continuous positive airway pressure (CPAP)    - " REFERRAL TO SLEEP STUDIES    5. Migraine without aura and without status migrainosus, not intractable      6. Kidney stones, calcium oxalate      7. Irritable bowel syndrome with diarrhea      8. Low T4      9. Neuralgia      10. Anxiety and depression    - DULoxetine (CYMBALTA) 20 MG Cap DR Particles; Take 2 Capsules by mouth every day for 30 days.  Dispense: 60 Capsule; Refill: 0  - buPROPion (WELLBUTRIN) 75 MG Tab; Take 1 Tablet by mouth 2 times a day.  Dispense: 60 Tablet; Refill: 0    11. Mild episode of recurrent major depressive disorder (HCC)        Followup: 2 weeks

## 2021-09-02 ENCOUNTER — OFFICE VISIT (OUTPATIENT)
Dept: MEDICAL GROUP | Facility: MEDICAL CENTER | Age: 41
End: 2021-09-02
Payer: COMMERCIAL

## 2021-09-02 VITALS
TEMPERATURE: 97.3 F | DIASTOLIC BLOOD PRESSURE: 72 MMHG | OXYGEN SATURATION: 96 % | SYSTOLIC BLOOD PRESSURE: 114 MMHG | WEIGHT: 177.4 LBS | HEIGHT: 69 IN | HEART RATE: 89 BPM | BODY MASS INDEX: 26.28 KG/M2

## 2021-09-02 DIAGNOSIS — R79.89 LOW T4: ICD-10-CM

## 2021-09-02 DIAGNOSIS — F32.A ANXIETY AND DEPRESSION: ICD-10-CM

## 2021-09-02 DIAGNOSIS — F41.9 ANXIETY AND DEPRESSION: ICD-10-CM

## 2021-09-02 DIAGNOSIS — F33.0 MILD EPISODE OF RECURRENT MAJOR DEPRESSIVE DISORDER (HCC): ICD-10-CM

## 2021-09-02 DIAGNOSIS — M79.651 RIGHT THIGH PAIN: ICD-10-CM

## 2021-09-02 PROCEDURE — 99213 OFFICE O/P EST LOW 20 MIN: CPT | Performed by: PHYSICIAN ASSISTANT

## 2021-09-02 RX ORDER — BUPROPION HYDROCHLORIDE 150 MG/1
150 TABLET ORAL EVERY MORNING
Qty: 60 TABLET | Refills: 2 | Status: SHIPPED | OUTPATIENT
Start: 2021-09-02 | End: 2021-10-02

## 2021-09-02 ASSESSMENT — FIBROSIS 4 INDEX: FIB4 SCORE: 0.79

## 2021-09-02 NOTE — ASSESSMENT & PLAN NOTE
Doing well on decreasing dose of duloxetine and increasing dose of wellbutrin. Would like to continue current.

## 2021-09-02 NOTE — PROGRESS NOTES
Subjective:   Tiana Lopez is a 41 y.o. female here today for f/u med changes    Low T4  Normal tpo, normal tsh - feels fatigued and unable to lose weight, would like to see an endocrinologist to discuss    Mild episode of recurrent major depressive disorder (HCC)  Doing well on decreasing dose of duloxetine and increasing dose of wellbutrin. Would like to continue current.    Right thigh pain  Left over from when she had right groin cyst/infection. Seems to be getting better.       Current medicines (including changes today)  Current Outpatient Medications   Medication Sig Dispense Refill   • buPROPion (WELLBUTRIN XL) 150 MG XL tablet Take 1 Tablet by mouth every morning for 30 days. 60 Tablet 2   • DULoxetine (CYMBALTA) 20 MG Cap DR Particles Take 2 Capsules by mouth every day for 30 days. 60 Capsule 0   • buPROPion (WELLBUTRIN) 75 MG Tab Take 1 Tablet by mouth 2 times a day. 60 Tablet 0   • Multiple Vitamin (MULTI-VITAMIN DAILY PO) Take  by mouth.     • Non Formulary Request collidial silver- Taking 17 sprays sublingual daily.     • VITAMIN D PO Place  under the tongue.       No current facility-administered medications for this visit.     She  has a past medical history of Bronchitis (2009), DVT (deep venous thrombosis) ([facility]) (2015), Encounter for long-term (current) use of other medications, Fibroids (2017), Migraine, Pain (1/2017), Psychiatric problem, Renal disorder, Seizure ([facility]) (2013), Sepsis ([facility]) (4/2015), Sleep apnea with use of continuous positive airway pressure (CPAP) (10/12/2018), and Unspecified diffuse connective tissue disease.    ROS   No fever/chills. No weight change. No headache/dizziness. No focal weakness. No sore throat, nasal congestion, ear pain. No chest pain, no shortness of breath, difficulty breathing. No n/v/d/c or abdominal pain. No urinary complaint. No rash or skin lesion. No joint pain or swelling.       Objective:     /72 (BP Location: Right arm, Patient  "Position: Sitting, BP Cuff Size: Adult long)   Pulse 89   Temp 36.3 °C (97.3 °F) (Temporal)   Ht 1.753 m (5' 9\")   Wt 80.5 kg (177 lb 6.4 oz)   SpO2 96%  Body mass index is 26.2 kg/m².   Physical Exam:  Constitutional: WDWN, NAD  Skin: Warm, dry, good turgor, no rashes in visible areas.  Psych: Alert and oriented x3, normal affect and mood.        Assessment and Plan:   The following treatment plan was discussed    1. Right thigh pain      2. Anxiety and depression  Decrease cymbalta to 20mg daily for 1-2 weeks then stop  - buPROPion (WELLBUTRIN XL) 150 MG XL tablet; Take 1 Tablet by mouth every morning for 30 days.  Dispense: 60 Tablet; Refill: 2    3. Low T4    - REFERRAL TO ENDOCRINOLOGY    4. Mild episode of recurrent major depressive disorder (HCC)        Followup: 2 weeks         "

## 2021-09-02 NOTE — LETTER
[facility]  [facility]  [address]  [place] [address]  Fax: [phone]   Authorization for Release/Disclosure of   Protected Health Information   Name: TIANA HONG : 1980 SSN: [id number]   Address: [address] [place] [address] [address] [place] [address] Phone:    [phone] (home) [phone] (work)   I authorize the entity listed below to release/disclose the PHI below to:   [facility] and [facility]   Provider or Entity Name:     Address   City, State, Zip   Phone:      Fax:     Reason for request: continuity of care   Information to be released:    [  ] LAST COLONOSCOPY,  including any PATH REPORT and follow-up  [  ] LAST FIT/COLOGUARD RESULT [  ] LAST DEXA  [  ] LAST MAMMOGRAM  [  ] LAST PAP  [  ] LAST LABS [  ] RETINA EXAM REPORT  [  ] IMMUNIZATION RECORDS  [  ] Release all info      [  ] Check here and initial the line next to each item to release ALL health information INCLUDING  _____ Care and treatment for drug and / or alcohol abuse  _____ HIV testing, infection status, or AIDS  _____ Genetic Testing    DATES OF SERVICE OR TIME PERIOD TO BE DISCLOSED: _____________  I understand and acknowledge that:  * This Authorization may be revoked at any time by you in writing, except if your health information has already been used or disclosed.  * Your health information that will be used or disclosed as a result of you signing this authorization could be re-disclosed by the recipient. If this occurs, your re-disclosed health information may no longer be protected by State or Federal laws.  * You may refuse to sign this Authorization. Your refusal will not affect your ability to obtain treatment.  * This Authorization becomes effective upon signing and will  on (date) __________.      If no date is indicated, this Authorization will  one (1) year from the signature date.    Name: Tiana Coffman  John    Signature:   Date:     9/2/2021       PLEASE FAX REQUESTED RECORDS BACK TO: [phone]

## 2021-09-02 NOTE — ASSESSMENT & PLAN NOTE
Normal tpo, normal tsh - feels fatigued and unable to lose weight, would like to see an endocrinologist to discuss

## 2021-09-13 DIAGNOSIS — F32.A ANXIETY AND DEPRESSION: ICD-10-CM

## 2021-09-13 DIAGNOSIS — F41.9 ANXIETY AND DEPRESSION: ICD-10-CM

## 2021-09-13 RX ORDER — BUPROPION HYDROCHLORIDE 75 MG/1
TABLET ORAL
Qty: 60 TABLET | Refills: 0 | Status: SHIPPED | OUTPATIENT
Start: 2021-09-13 | End: 2021-09-16

## 2021-09-13 RX ORDER — DULOXETIN HYDROCHLORIDE 20 MG/1
40 CAPSULE, DELAYED RELEASE ORAL DAILY
Qty: 60 CAPSULE | Refills: 0 | Status: SHIPPED | OUTPATIENT
Start: 2021-09-13 | End: 2021-10-13

## 2021-09-16 ENCOUNTER — OFFICE VISIT (OUTPATIENT)
Dept: MEDICAL GROUP | Facility: MEDICAL CENTER | Age: 41
End: 2021-09-16
Payer: COMMERCIAL

## 2021-09-16 VITALS
TEMPERATURE: 97 F | SYSTOLIC BLOOD PRESSURE: 130 MMHG | HEART RATE: 78 BPM | DIASTOLIC BLOOD PRESSURE: 82 MMHG | WEIGHT: 180.2 LBS | OXYGEN SATURATION: 98 % | BODY MASS INDEX: 26.69 KG/M2 | HEIGHT: 69 IN

## 2021-09-16 DIAGNOSIS — F17.210 CIGARETTE SMOKER: ICD-10-CM

## 2021-09-16 DIAGNOSIS — F33.0 MILD EPISODE OF RECURRENT MAJOR DEPRESSIVE DISORDER (HCC): ICD-10-CM

## 2021-09-16 PROCEDURE — 99213 OFFICE O/P EST LOW 20 MIN: CPT | Performed by: PHYSICIAN ASSISTANT

## 2021-09-16 RX ORDER — BUPROPION HYDROCHLORIDE 300 MG/1
300 TABLET ORAL EVERY MORNING
Qty: 30 TABLET | Refills: 2 | Status: SHIPPED | OUTPATIENT
Start: 2021-09-16 | End: 2022-02-26

## 2021-09-16 ASSESSMENT — FIBROSIS 4 INDEX: FIB4 SCORE: 0.79

## 2021-09-16 ASSESSMENT — PATIENT HEALTH QUESTIONNAIRE - PHQ9
SUM OF ALL RESPONSES TO PHQ QUESTIONS 1-9: 2
SUM OF ALL RESPONSES TO PHQ9 QUESTIONS 1 AND 2: 0
8. MOVING OR SPEAKING SO SLOWLY THAT OTHER PEOPLE COULD HAVE NOTICED. OR THE OPPOSITE, BEING SO FIGETY OR RESTLESS THAT YOU HAVE BEEN MOVING AROUND A LOT MORE THAN USUAL: NOT AT ALL
7. TROUBLE CONCENTRATING ON THINGS, SUCH AS READING THE NEWSPAPER OR WATCHING TELEVISION: NOT AT ALL
3. TROUBLE FALLING OR STAYING ASLEEP OR SLEEPING TOO MUCH: NOT AT ALL
5. POOR APPETITE OR OVEREATING: SEVERAL DAYS
4. FEELING TIRED OR HAVING LITTLE ENERGY: SEVERAL DAYS
6. FEELING BAD ABOUT YOURSELF - OR THAT YOU ARE A FAILURE OR HAVE LET YOURSELF OR YOUR FAMILY DOWN: NOT AL ALL
2. FEELING DOWN, DEPRESSED, IRRITABLE, OR HOPELESS: NOT AT ALL
1. LITTLE INTEREST OR PLEASURE IN DOING THINGS: NOT AT ALL
9. THOUGHTS THAT YOU WOULD BE BETTER OFF DEAD, OR OF HURTING YOURSELF: NOT AT ALL

## 2021-09-16 NOTE — ASSESSMENT & PLAN NOTE
On 20mg cymbalta, will stop now. On 150mg XL daily, a little down, will increase to 300mg XL and follow up one month

## 2021-09-16 NOTE — PROGRESS NOTES
Subjective:   Tiana Lopez is a 41 y.o. female here today for f/u depression, doing well    Mild episode of recurrent major depressive disorder (HCC)  On 20mg cymbalta, will stop now. On 150mg XL daily, a little down, will increase to 300mg XL and follow up one month    Cigarette smoker  Quit on Monday, doing well       Current medicines (including changes today)  Current Outpatient Medications   Medication Sig Dispense Refill   • buPROPion (WELLBUTRIN XL) 300 MG XL tablet Take 1 Tablet by mouth every morning. 30 Tablet 2   • DULoxetine (CYMBALTA) 20 MG Cap DR Particles TAKE 2 CAPSULES BY MOUTH EVERY DAY FOR 30 DAYS. 60 Capsule 0   • buPROPion (WELLBUTRIN XL) 150 MG XL tablet Take 1 Tablet by mouth every morning for 30 days. 60 Tablet 2   • Multiple Vitamin (MULTI-VITAMIN DAILY PO) Take  by mouth.     • Non Formulary Request collidial silver- Taking 17 sprays sublingual daily.     • VITAMIN D PO Place  under the tongue.     • buPROPion (WELLBUTRIN) 75 MG Tab TAKE 1 TABLET BY MOUTH TWICE A DAY (Patient not taking: Reported on 9/16/2021) 60 Tablet 0     No current facility-administered medications for this visit.     She  has a past medical history of Bronchitis (2009), DVT (deep venous thrombosis) ([facility]) (2015), Encounter for long-term (current) use of other medications, Fibroids (2017), Migraine, Pain (1/2017), Psychiatric problem, Renal disorder, Seizure ([facility]) (2013), Sepsis ([facility]) (4/2015), Sleep apnea with use of continuous positive airway pressure (CPAP) (10/12/2018), and Unspecified diffuse connective tissue disease.    ROS   No fever/chills. No weight change. No headache/dizziness. No focal weakness. No sore throat, nasal congestion, ear pain. No chest pain, no shortness of breath, difficulty breathing. No n/v/d/c or abdominal pain. No urinary complaint. No rash or skin lesion. No joint pain or swelling.       Objective:     /82 (BP Location: Right arm, Patient Position: Sitting, BP Cuff  "Size: Adult)   Pulse 78   Temp 36.1 °C (97 °F) (Temporal)   Ht 1.753 m (5' 9\")   Wt 81.7 kg (180 lb 3.2 oz)   SpO2 98%  Body mass index is 26.61 kg/m².   Physical Exam  Constitutional: WDWN, NAD  Skin: Warm, dry, good turgor, no rashes in visible areas.  Psych: Alert and oriented x3, normal affect and mood.    Assessment and Plan:   The following treatment plan was discussed    1. Mild episode of recurrent major depressive disorder (HCC)    - buPROPion (WELLBUTRIN XL) 300 MG XL tablet; Take 1 Tablet by mouth every morning.  Dispense: 30 Tablet; Refill: 2      Followup: one month         "

## 2021-10-04 ENCOUNTER — PATIENT MESSAGE (OUTPATIENT)
Dept: MEDICAL GROUP | Facility: MEDICAL CENTER | Age: 41
End: 2021-10-04

## 2021-10-04 DIAGNOSIS — F33.0 MILD EPISODE OF RECURRENT MAJOR DEPRESSIVE DISORDER (HCC): ICD-10-CM

## 2021-10-14 RX ORDER — DULOXETIN HYDROCHLORIDE 30 MG/1
30 CAPSULE, DELAYED RELEASE ORAL DAILY
Qty: 30 CAPSULE | Refills: 2 | Status: SHIPPED | OUTPATIENT
Start: 2021-10-14 | End: 2022-02-26

## 2022-02-26 ENCOUNTER — HOSPITAL ENCOUNTER (OUTPATIENT)
Facility: MEDICAL CENTER | Age: 42
End: 2022-02-26
Attending: NURSE PRACTITIONER
Payer: COMMERCIAL

## 2022-02-26 ENCOUNTER — OFFICE VISIT (OUTPATIENT)
Dept: URGENT CARE | Facility: PHYSICIAN GROUP | Age: 42
End: 2022-02-26
Payer: COMMERCIAL

## 2022-02-26 VITALS
BODY MASS INDEX: 25.48 KG/M2 | TEMPERATURE: 96.8 F | OXYGEN SATURATION: 97 % | SYSTOLIC BLOOD PRESSURE: 158 MMHG | WEIGHT: 172 LBS | HEIGHT: 69 IN | DIASTOLIC BLOOD PRESSURE: 62 MMHG | HEART RATE: 90 BPM | RESPIRATION RATE: 20 BRPM

## 2022-02-26 DIAGNOSIS — J02.9 SORE THROAT: ICD-10-CM

## 2022-02-26 LAB
INT CON NEG: NEGATIVE
INT CON POS: POSITIVE
S PYO AG THROAT QL: NEGATIVE

## 2022-02-26 PROCEDURE — 87070 CULTURE OTHR SPECIMN AEROBIC: CPT

## 2022-02-26 PROCEDURE — 99213 OFFICE O/P EST LOW 20 MIN: CPT | Performed by: NURSE PRACTITIONER

## 2022-02-26 PROCEDURE — 87880 STREP A ASSAY W/OPTIC: CPT | Performed by: NURSE PRACTITIONER

## 2022-02-26 RX ORDER — LIDOCAINE HYDROCHLORIDE 20 MG/ML
5 SOLUTION OROPHARYNGEAL 4 TIMES DAILY PRN
Qty: 100 ML | Refills: 0 | Status: SHIPPED | OUTPATIENT
Start: 2022-02-26 | End: 2022-03-03

## 2022-02-26 RX ORDER — PREDNISONE 10 MG/1
10 TABLET ORAL DAILY
Qty: 5 TABLET | Refills: 0 | Status: SHIPPED | OUTPATIENT
Start: 2022-02-26 | End: 2022-03-03

## 2022-02-26 RX ORDER — DICLOFENAC SODIUM 25 MG/1
50 TABLET, DELAYED RELEASE ORAL 2 TIMES DAILY
COMMUNITY

## 2022-02-26 ASSESSMENT — ENCOUNTER SYMPTOMS
COUGH: 0
ABDOMINAL PAIN: 0
DIARRHEA: 0
CONSTIPATION: 0
NAUSEA: 0
VOMITING: 0
NECK PAIN: 0
WHEEZING: 0
SORE THROAT: 1
WEAKNESS: 0
EYE REDNESS: 0
MYALGIAS: 0
HEADACHES: 0
EYE DISCHARGE: 0
CHILLS: 0
FEVER: 0
SHORTNESS OF BREATH: 0
DIZZINESS: 0

## 2022-02-26 ASSESSMENT — FIBROSIS 4 INDEX: FIB4 SCORE: 0.79

## 2022-02-26 NOTE — PROGRESS NOTES
"Subjective     Tiana Halie Lopez is a 41 y.o. female who presents with Pharyngitis (3x days; Pt finished Rx and not getting better. )            HPI  Sore throat x 3 days. Seen by telemed on 2/9/2022 for sore throat, placed on PCN x 10 day, last dose 1 week ago. Ear pressure. Recent COVID infection beginning of January 2022. No nasal congestion, runny nose of post nasal drainage.     PMH:  has a past medical history of Bronchitis (2009), DVT (deep venous thrombosis) ([facility]) (2015), Encounter for long-term (current) use of other medications, Fibroids (2017), Migraine, Pain (1/2017), Psychiatric problem, Renal disorder, Seizure ([facility]) (2013), Sepsis ([facility]) (4/2015), Sleep apnea with use of continuous positive airway pressure (CPAP) (10/12/2018), and Unspecified diffuse connective tissue disease.  MEDS:   Current Outpatient Medications:   •  diclofenac DR (VOLTAREN) 25 MG Tablet Delayed Response, Take 50 mg by mouth 2 times a day., Disp: , Rfl:   •  DULoxetine (CYMBALTA) 60 MG Cap DR Particles delayed-release capsule, Take 1 Capsule by mouth every morning., Disp: 90 Capsule, Rfl: 3  •  Multiple Vitamin (MULTI-VITAMIN DAILY PO), Take  by mouth., Disp: , Rfl:   •  Non Formulary Request, collidial silver- Taking 17 sprays sublingual daily., Disp: , Rfl:   •  VITAMIN D PO, Place  under the tongue., Disp: , Rfl:   •  DULoxetine (CYMBALTA) 30 MG Cap DR Particles, Take 1 Capsule by mouth every day. (Patient not taking: Reported on 2/26/2022), Disp: 30 Capsule, Rfl: 2  •  buPROPion (WELLBUTRIN XL) 300 MG XL tablet, Take 1 Tablet by mouth every morning. (Patient not taking: Reported on 2/26/2022), Disp: 30 Tablet, Rfl: 2  ALLERGIES:   Allergies   Allergen Reactions   • Chloraprep One Step Rash and Itching     Pt gets severe weeping rash where antiseptic was used   • Codeine Vomiting   • Tea Tree Oil Shortness of Breath   • Tramadol      \"got dizzy, couldn't move\"   • Turmeric Anaphylaxis   • Mobic Hives   • Peppermint " "Oil Itching   • Other Misc      Adhesives including EKG stickers rash at contact site   • Senokot Rash     Had rash when taken   • Sulfa Drugs      Per allergy blood test   • Tape Rash     All tapes rash at contact site   • Tree Nuts Food Allergy              SURGHX:   Past Surgical History:   Procedure Laterality Date   • HYSTERECTOMY ROBOTIC SINGLE SITE  2/6/2017    Procedure: HYSTERECTOMY ROBOTIC SINGLE SITE BILATERAL SALPINGECTOMY ;  Surgeon: Zaynab Dailey M.D.;  Location: SURGERY [facility];  Service:    • CYSTOSCOPY  2/6/2017    Procedure: CYSTOSCOPY;  Surgeon: Zaynab Dailey M.D.;  Location: SURGERY [facility];  Service:      SOCHX:  reports that she quit smoking about 5 months ago. Her smoking use included cigarettes. She smoked 0.25 packs per day. She has never used smokeless tobacco. She reports current alcohol use. She reports current drug use. Drugs: Marijuana and Inhaled.  FH: Family history was reviewed, no pertinent findings to report  .   Review of Systems   Constitutional: Negative for chills, fever and malaise/fatigue.   HENT: Positive for ear pain and sore throat. Negative for congestion.    Eyes: Negative for discharge and redness.   Respiratory: Negative for cough, shortness of breath and wheezing.    Gastrointestinal: Negative for abdominal pain, constipation, diarrhea, nausea and vomiting.   Musculoskeletal: Negative for myalgias and neck pain.   Skin: Negative for itching and rash.   Neurological: Negative for dizziness, weakness and headaches.   Endo/Heme/Allergies: Negative for environmental allergies.   All other systems reviewed and are negative.             Objective     /62 (BP Location: Left arm, Patient Position: Sitting, BP Cuff Size: Adult)   Pulse 90   Temp 36 °C (96.8 °F) (Temporal)   Resp 20   Ht 1.753 m (5' 9\")   Wt 78 kg (172 lb)   LMP 01/29/2017 (Exact Date)   SpO2 97%   BMI 25.40 kg/m²      Physical Exam  Vitals reviewed.   Constitutional:  "      General: She is awake. She is not in acute distress.     Appearance: Normal appearance. She is well-developed. She is not ill-appearing, toxic-appearing or diaphoretic.   HENT:      Head: Normocephalic.      Right Ear: Ear canal and external ear normal. A middle ear effusion is present. Tympanic membrane is bulging.      Left Ear: Ear canal and external ear normal. A middle ear effusion is present. Tympanic membrane is bulging.      Nose: Nose normal.      Mouth/Throat:      Lips: Pink.      Mouth: Mucous membranes are dry.      Pharynx: Oropharynx is clear. Uvula midline.   Eyes:      Conjunctiva/sclera: Conjunctivae normal.      Pupils: Pupils are equal, round, and reactive to light.   Cardiovascular:      Rate and Rhythm: Normal rate.   Pulmonary:      Effort: Pulmonary effort is normal.   Musculoskeletal:         General: Normal range of motion.      Cervical back: Normal range of motion and neck supple.   Skin:     General: Skin is warm and dry.   Neurological:      Mental Status: She is alert and oriented to person, place, and time.   Psychiatric:         Attention and Perception: Attention normal.         Mood and Affect: Mood normal.         Speech: Speech normal.         Behavior: Behavior normal. Behavior is cooperative.                             Assessment & Plan                1. Sore throat    - POCT Rapid Strep A  - CULTURE THROAT; Future  - lidocaine (XYLOCAINE) 2 % Solution; Take 5 mL by mouth 4 times a day as needed for Throat/Mouth Pain for up to 5 days. Gargle and spit out after salt water gargle.  Dispense: 100 mL; Refill: 0  - predniSONE (DELTASONE) 10 MG Tab; Take 1 Tablet by mouth every day for 5 days.  Dispense: 5 Tablet; Refill: 0      -Increase water intake  -May use over the counter Ibuprofen/Tylenol as needed for any ear or throat pain  -May take long acting antihistamine for seasonal allergy symptoms as needed (without decongestant)  -May use over the counter saline nasal spray  for nasal congestion as needed  -May use over the counter Nasacort/Flonase for nasal congestion as needed   -May use throat lozenges for throat discomfort as needed   -May gargle with salt water up to 4x/day as needed for throat discomfort (1 tsp salt dissolved in 1 cup warm water)  -May drink smoothies for nutrition if too painful to swallow solid foods  -Monitor for any sinus pain/pressure with sinus congestion with thick mucus production, sinus headache, cough, shortness of breath, fever- need re-evaluation    MyChart release of throat culture

## 2022-02-27 DIAGNOSIS — J02.9 SORE THROAT: ICD-10-CM

## 2022-03-01 LAB
BACTERIA SPEC RESP CULT: NORMAL
SIGNIFICANT IND 70042: NORMAL
SITE SITE: NORMAL
SOURCE SOURCE: NORMAL

## 2022-03-30 ENCOUNTER — HOSPITAL ENCOUNTER (OUTPATIENT)
Dept: RADIOLOGY | Facility: MEDICAL CENTER | Age: 42
End: 2022-03-30
Attending: PHYSICIAN ASSISTANT
Payer: COMMERCIAL

## 2022-03-30 ENCOUNTER — OFFICE VISIT (OUTPATIENT)
Dept: MEDICAL GROUP | Facility: MEDICAL CENTER | Age: 42
End: 2022-03-30
Payer: COMMERCIAL

## 2022-03-30 VITALS
HEIGHT: 69 IN | BODY MASS INDEX: 26.19 KG/M2 | OXYGEN SATURATION: 98 % | SYSTOLIC BLOOD PRESSURE: 160 MMHG | TEMPERATURE: 97.8 F | HEART RATE: 89 BPM | WEIGHT: 176.8 LBS | DIASTOLIC BLOOD PRESSURE: 92 MMHG

## 2022-03-30 DIAGNOSIS — K59.00 CONSTIPATION, UNSPECIFIED CONSTIPATION TYPE: ICD-10-CM

## 2022-03-30 DIAGNOSIS — Z00.00 WELLNESS EXAMINATION: ICD-10-CM

## 2022-03-30 DIAGNOSIS — Z13.6 SCREENING FOR CARDIOVASCULAR CONDITION: ICD-10-CM

## 2022-03-30 DIAGNOSIS — Z90.710 S/P HYSTERECTOMY: ICD-10-CM

## 2022-03-30 DIAGNOSIS — I10 PRIMARY HYPERTENSION: ICD-10-CM

## 2022-03-30 DIAGNOSIS — E55.9 VITAMIN D DEFICIENCY: ICD-10-CM

## 2022-03-30 PROCEDURE — 74018 RADEX ABDOMEN 1 VIEW: CPT

## 2022-03-30 PROCEDURE — 99214 OFFICE O/P EST MOD 30 MIN: CPT | Performed by: PHYSICIAN ASSISTANT

## 2022-03-30 RX ORDER — NICOTINE 14MG/24HR
PATCH, TRANSDERMAL 24 HOURS TRANSDERMAL
COMMUNITY

## 2022-03-30 ASSESSMENT — PATIENT HEALTH QUESTIONNAIRE - PHQ9: CLINICAL INTERPRETATION OF PHQ2 SCORE: 0

## 2022-03-30 ASSESSMENT — FIBROSIS 4 INDEX: FIB4 SCORE: 0.79

## 2022-03-30 NOTE — ASSESSMENT & PLAN NOTE
"History of IBS. 2-3 bowls of ice cream a week ago. Had extreme abdominal pain, cramping. Used a suppository which only helped a little. Still having a lot of discomfort LUQ. Using muscle massage, ducolax stool softener, smooth move tea, probiotic, \"Calm\". Feeling a little nauseated, no vomiting.  "

## 2022-03-31 ENCOUNTER — HOSPITAL ENCOUNTER (OUTPATIENT)
Dept: LAB | Facility: MEDICAL CENTER | Age: 42
End: 2022-03-31
Attending: PHYSICIAN ASSISTANT
Payer: COMMERCIAL

## 2022-03-31 DIAGNOSIS — I10 PRIMARY HYPERTENSION: ICD-10-CM

## 2022-03-31 DIAGNOSIS — Z00.00 WELLNESS EXAMINATION: ICD-10-CM

## 2022-03-31 DIAGNOSIS — E55.9 VITAMIN D DEFICIENCY: ICD-10-CM

## 2022-03-31 DIAGNOSIS — Z90.710 S/P HYSTERECTOMY: ICD-10-CM

## 2022-03-31 LAB
25(OH)D3 SERPL-MCNC: 157 NG/ML (ref 30–100)
ALBUMIN SERPL BCP-MCNC: 4.5 G/DL (ref 3.2–4.9)
ALBUMIN/GLOB SERPL: 2 G/DL
ALP SERPL-CCNC: 111 U/L (ref 30–99)
ALT SERPL-CCNC: 11 U/L (ref 2–50)
ANION GAP SERPL CALC-SCNC: 12 MMOL/L (ref 7–16)
AST SERPL-CCNC: 13 U/L (ref 12–45)
BASOPHILS # BLD AUTO: 0.6 % (ref 0–1.8)
BASOPHILS # BLD: 0.07 K/UL (ref 0–0.12)
BILIRUB SERPL-MCNC: 0.2 MG/DL (ref 0.1–1.5)
BUN SERPL-MCNC: 13 MG/DL (ref 8–22)
CALCIUM SERPL-MCNC: 10 MG/DL (ref 8.5–10.5)
CHLORIDE SERPL-SCNC: 101 MMOL/L (ref 96–112)
CO2 SERPL-SCNC: 27 MMOL/L (ref 20–33)
CREAT SERPL-MCNC: 0.72 MG/DL (ref 0.5–1.4)
EOSINOPHIL # BLD AUTO: 0.22 K/UL (ref 0–0.51)
EOSINOPHIL NFR BLD: 1.9 % (ref 0–6.9)
ERYTHROCYTE [DISTWIDTH] IN BLOOD BY AUTOMATED COUNT: 43 FL (ref 35.9–50)
FASTING STATUS PATIENT QL REPORTED: NORMAL
FSH SERPL-ACNC: 4.7 MIU/ML
GFR SERPLBLD CREATININE-BSD FMLA CKD-EPI: 106 ML/MIN/1.73 M 2
GLOBULIN SER CALC-MCNC: 2.3 G/DL (ref 1.9–3.5)
GLUCOSE SERPL-MCNC: 90 MG/DL (ref 65–99)
HCT VFR BLD AUTO: 44.3 % (ref 37–47)
HGB BLD-MCNC: 14.6 G/DL (ref 12–16)
IMM GRANULOCYTES # BLD AUTO: 0.02 K/UL (ref 0–0.11)
IMM GRANULOCYTES NFR BLD AUTO: 0.2 % (ref 0–0.9)
LH SERPL-ACNC: 6.5 IU/L
LYMPHOCYTES # BLD AUTO: 2.4 K/UL (ref 1–4.8)
LYMPHOCYTES NFR BLD: 21 % (ref 22–41)
MCH RBC QN AUTO: 32.2 PG (ref 27–33)
MCHC RBC AUTO-ENTMCNC: 33 G/DL (ref 33.6–35)
MCV RBC AUTO: 97.8 FL (ref 81.4–97.8)
MONOCYTES # BLD AUTO: 1.06 K/UL (ref 0–0.85)
MONOCYTES NFR BLD AUTO: 9.3 % (ref 0–13.4)
NEUTROPHILS # BLD AUTO: 7.68 K/UL (ref 2–7.15)
NEUTROPHILS NFR BLD: 67 % (ref 44–72)
NRBC # BLD AUTO: 0 K/UL
NRBC BLD-RTO: 0 /100 WBC
PLATELET # BLD AUTO: 326 K/UL (ref 164–446)
PMV BLD AUTO: 10.9 FL (ref 9–12.9)
POTASSIUM SERPL-SCNC: 4.7 MMOL/L (ref 3.6–5.5)
PROGEST SERPL-MCNC: 6.37 NG/ML
PROT SERPL-MCNC: 6.8 G/DL (ref 6–8.2)
RBC # BLD AUTO: 4.53 M/UL (ref 4.2–5.4)
SODIUM SERPL-SCNC: 140 MMOL/L (ref 135–145)
T4 FREE SERPL-MCNC: 0.9 NG/DL (ref 0.93–1.7)
TSH SERPL DL<=0.005 MIU/L-ACNC: 2.29 UIU/ML (ref 0.38–5.33)
WBC # BLD AUTO: 11.5 K/UL (ref 4.8–10.8)

## 2022-03-31 PROCEDURE — 36415 COLL VENOUS BLD VENIPUNCTURE: CPT

## 2022-03-31 PROCEDURE — 82671 ASSAY OF ESTROGENS: CPT

## 2022-03-31 PROCEDURE — 82306 VITAMIN D 25 HYDROXY: CPT

## 2022-03-31 PROCEDURE — 83001 ASSAY OF GONADOTROPIN (FSH): CPT

## 2022-03-31 PROCEDURE — 84439 ASSAY OF FREE THYROXINE: CPT

## 2022-03-31 PROCEDURE — 83002 ASSAY OF GONADOTROPIN (LH): CPT

## 2022-03-31 PROCEDURE — 84144 ASSAY OF PROGESTERONE: CPT

## 2022-03-31 PROCEDURE — 80053 COMPREHEN METABOLIC PANEL: CPT

## 2022-03-31 PROCEDURE — 84443 ASSAY THYROID STIM HORMONE: CPT

## 2022-03-31 PROCEDURE — 85025 COMPLETE CBC W/AUTO DIFF WBC: CPT

## 2022-03-31 NOTE — PROGRESS NOTES
"Subjective:   Tiana Lopez is a 41 y.o. female here today for concern with BP and LUQ discomfort/constipation    Chief Complaint   Patient presents with   • GI Problem     LUQ pain, issues with contipation   • Hypertension     Running elevated        Constipation  History of IBS. 2-3 bowls of ice cream a week ago. Had extreme abdominal pain, cramping. Used a suppository which only helped a little. Still having a lot of discomfort LUQ. Using muscle massage, ducolax stool softener, smooth move tea, probiotic, \"Calm\". Feeling a little nauseated, no vomiting.    Primary hypertension  New concern, does have family history, will work on lowering sodium, check labs then f/u 1-2 weeks, no headache/dizziness, no chest pain, SOB or difficulty breathing       Current medicines (including changes today)  Current Outpatient Medications   Medication Sig Dispense Refill   • Saccharomyces boulardii (PROBIOTIC) 250 MG Cap Take  by mouth.     • diclofenac DR (VOLTAREN) 25 MG Tablet Delayed Response Take 50 mg by mouth 2 times a day.     • DULoxetine (CYMBALTA) 60 MG Cap DR Particles delayed-release capsule Take 1 Capsule by mouth every morning. 90 Capsule 3   • Multiple Vitamin (MULTI-VITAMIN DAILY PO) Take  by mouth.     • Non Formulary Request collidial silver- Taking 17 sprays sublingual daily.     • VITAMIN D PO Place  under the tongue.       No current facility-administered medications for this visit.     She  has a past medical history of Bronchitis (2009), DVT (deep venous thrombosis) ([facility]) (2015), Encounter for long-term (current) use of other medications, Fibroids (2017), Migraine, Pain (1/2017), Psychiatric problem, Renal disorder, Seizure ([facility]) (2013), Sepsis ([facility]) (4/2015), Sleep apnea with use of continuous positive airway pressure (CPAP) (10/12/2018), and Unspecified diffuse connective tissue disease.    ROS   No fever/chills. No weight change. No headache/dizziness. No focal weakness. No sore throat, " "nasal congestion, ear pain. No chest pain, no shortness of breath, difficulty breathing.No urinary complaint. No rash or skin lesion. No joint pain or swelling.     Objective:     /92 (BP Location: Right arm, Patient Position: Sitting, BP Cuff Size: Adult)   Pulse 89   Temp 36.6 °C (97.8 °F) (Temporal)   Ht 1.753 m (5' 9\")   Wt 80.2 kg (176 lb 12.8 oz)   SpO2 98%  Body mass index is 26.11 kg/m².   Physical Exam:  Constitutional: WDWN, NAD  Skin: Warm, dry, good turgor, no rashes in visible areas.  Respiratory: Unlabored respiratory effort, lungs clear to auscultation, no wheezes, no ronchi.  Cardiovascular: Normal S1, S2, no murmur, no leg edema.  Abdomen: Soft, non-tender, no masses, no hepatosplenomegaly.  Psych: Alert and oriented x3, normal affect and mood.      Assessment and Plan:   The following treatment plan was discussed    1. Constipation, unspecified constipation type    - ND-KYGOASE-1 VIEW; Future    2. Primary hypertension    - Comp Metabolic Panel; Future  - TSH WITH REFLEX TO FT4; Future  - FREE THYROXINE; Future    3. Wellness examination    - CBC WITH DIFFERENTIAL; Future    4. Screening for cardiovascular condition      5. S/P hysterectomy    - FSH; Future  - LUTEINIZING HORMONE SERUM; Future  - ESTROGEN TOTAL; Future  - PROGESTERONE; Future    6. Vitamin D deficiency    - VITAMIN D,25 HYDROXY; Future      Followup: after labs and imaging         "

## 2022-03-31 NOTE — ASSESSMENT & PLAN NOTE
New concern, does have family history, will work on lowering sodium, check labs then f/u 1-2 weeks, no headache/dizziness, no chest pain, SOB or difficulty breathing

## 2022-04-06 ENCOUNTER — OFFICE VISIT (OUTPATIENT)
Dept: MEDICAL GROUP | Facility: MEDICAL CENTER | Age: 42
End: 2022-04-06
Payer: COMMERCIAL

## 2022-04-06 VITALS
HEIGHT: 69 IN | SYSTOLIC BLOOD PRESSURE: 132 MMHG | DIASTOLIC BLOOD PRESSURE: 80 MMHG | WEIGHT: 178.4 LBS | BODY MASS INDEX: 26.42 KG/M2 | TEMPERATURE: 97.4 F | OXYGEN SATURATION: 100 % | HEART RATE: 89 BPM

## 2022-04-06 DIAGNOSIS — K59.00 CONSTIPATION, UNSPECIFIED CONSTIPATION TYPE: ICD-10-CM

## 2022-04-06 DIAGNOSIS — E67.3 HIGH VITAMIN D LEVEL: ICD-10-CM

## 2022-04-06 DIAGNOSIS — I10 PRIMARY HYPERTENSION: ICD-10-CM

## 2022-04-06 DIAGNOSIS — R79.89 LOW SERUM VITAMIN D: ICD-10-CM

## 2022-04-06 PROCEDURE — 99213 OFFICE O/P EST LOW 20 MIN: CPT | Performed by: PHYSICIAN ASSISTANT

## 2022-04-06 ASSESSMENT — FIBROSIS 4 INDEX: FIB4 SCORE: 0.52

## 2022-04-06 NOTE — PROGRESS NOTES
Subjective:   Tiana Lopez is a 41 y.o. female here today for follow up after labs, f/u HTN    Chief Complaint   Patient presents with   • Follow-Up     Lab results and HTN       Low serum vitamin D  Was taking 3 drops daily. Hold off for a month. Retest one month    Constipation  Improved after taking magnesium citrate, symptoms resolved, no further concerns    Primary hypertension  Not checking regularly at home, improved in office today, watching sodium intake       Current medicines (including changes today)  Current Outpatient Medications   Medication Sig Dispense Refill   • Saccharomyces boulardii (PROBIOTIC) 250 MG Cap Take  by mouth.     • diclofenac DR (VOLTAREN) 25 MG Tablet Delayed Response Take 50 mg by mouth 2 times a day.     • DULoxetine (CYMBALTA) 60 MG Cap DR Particles delayed-release capsule Take 1 Capsule by mouth every morning. 90 Capsule 3   • Multiple Vitamin (MULTI-VITAMIN DAILY PO) Take  by mouth.     • Non Formulary Request collidial silver- Taking 17 sprays sublingual daily.     • VITAMIN D PO Place  under the tongue.       No current facility-administered medications for this visit.     She  has a past medical history of Bronchitis (2009), DVT (deep venous thrombosis) ([facility]) (2015), Encounter for long-term (current) use of other medications, Fibroids (2017), Migraine, Pain (1/2017), Psychiatric problem, Renal disorder, Seizure ([facility]) (2013), Sepsis ([facility]) (4/2015), Sleep apnea with use of continuous positive airway pressure (CPAP) (10/12/2018), and Unspecified diffuse connective tissue disease.    ROS   No fever/chills. No weight change. No headache/dizziness. No focal weakness. No sore throat, nasal congestion, ear pain. No chest pain, no shortness of breath, difficulty breathing. No n/v/d/c or abdominal pain. No urinary complaint. No rash or skin lesion. No joint pain or swelling.       Objective:     /80 (BP Location: Left arm, Patient Position: Sitting, BP Cuff Size:  "Adult long)   Pulse 89   Temp 36.3 °C (97.4 °F) (Temporal)   Ht 1.753 m (5' 9\")   Wt 80.9 kg (178 lb 6.4 oz)   SpO2 100%  Body mass index is 26.35 kg/m².   Physical Exam:  Constitutional: WDWN, NAD  Skin: Warm, dry, good turgor, no rashes in visible areas.  Psych: Alert and oriented x3, normal affect and mood.    Assessment and Plan:   The following treatment plan was discussed    1. Low serum vitamin D      2. High vitamin D level    - VITAMIN D,25 HYDROXY; Future    3. Constipation, unspecified constipation type      4. Primary hypertension        Followup: one month         "

## 2022-04-12 LAB
ESTRADIOL SERPL HS-MCNC: 94.2 PG/ML
ESTRONE SERPL-MCNC: 107.8 PG/ML

## 2022-08-15 ENCOUNTER — HOSPITAL ENCOUNTER (OUTPATIENT)
Dept: RADIOLOGY | Facility: MEDICAL CENTER | Age: 42
End: 2022-08-15
Attending: NURSE PRACTITIONER
Payer: COMMERCIAL

## 2022-08-15 DIAGNOSIS — Z12.31 VISIT FOR SCREENING MAMMOGRAM: ICD-10-CM

## 2022-08-15 PROCEDURE — 77063 BREAST TOMOSYNTHESIS BI: CPT

## 2022-09-25 ENCOUNTER — OFFICE VISIT (OUTPATIENT)
Dept: URGENT CARE | Facility: PHYSICIAN GROUP | Age: 42
End: 2022-09-25
Payer: COMMERCIAL

## 2022-09-25 VITALS
OXYGEN SATURATION: 98 % | TEMPERATURE: 97.7 F | DIASTOLIC BLOOD PRESSURE: 70 MMHG | SYSTOLIC BLOOD PRESSURE: 112 MMHG | HEART RATE: 98 BPM | WEIGHT: 158 LBS | HEIGHT: 69 IN | RESPIRATION RATE: 16 BRPM | BODY MASS INDEX: 23.4 KG/M2

## 2022-09-25 DIAGNOSIS — J06.9 VIRAL URI: ICD-10-CM

## 2022-09-25 LAB
FLUAV+FLUBV AG SPEC QL IA: NEGATIVE
INT CON NEG: NEGATIVE
INT CON NEG: NEGATIVE
INT CON POS: POSITIVE
INT CON POS: POSITIVE
S PYO AG THROAT QL: NEGATIVE

## 2022-09-25 PROCEDURE — 87804 INFLUENZA ASSAY W/OPTIC: CPT | Performed by: FAMILY MEDICINE

## 2022-09-25 PROCEDURE — 87880 STREP A ASSAY W/OPTIC: CPT | Performed by: FAMILY MEDICINE

## 2022-09-25 PROCEDURE — 99213 OFFICE O/P EST LOW 20 MIN: CPT | Performed by: FAMILY MEDICINE

## 2022-09-25 ASSESSMENT — FIBROSIS 4 INDEX: FIB4 SCORE: 0.53

## 2022-09-25 NOTE — PROGRESS NOTES
Chief Complaint   Patient presents with    URI     X 4 days with fever, body aches and fatigue           Chief Complaint   Patient presents with    URI     X 4 days with fever, body aches and fatigue         Cough  This is a new problem. The current episode started 4 d ago. The problem has been unchanged. The problem occurs constantly. The cough is productive. Associated symptoms include : fatigue,   muscle aches, fever. Pertinent negatives include no   nausea, vomiting, diarrhea, sweats, weight loss or wheezing. Nothing aggravates the symptoms.  Patient has tried nothing for the symptoms. There is no history of asthma.        Past Medical History:   Diagnosis Date    Bronchitis     DVT (deep venous thrombosis) ([facility])     right arm from PICC line    Encounter for long-term (current) use of other medications     Fibroids     Migraine     Pain 2017    lower abdomen    Psychiatric problem     depression/anxiety    Renal disorder     stones    Seizure ([facility])     Febrile    Sepsis ([facility]) 2015    from PICC line    Sleep apnea with use of continuous positive airway pressure (CPAP) 10/12/2018    Diagnosed after hysterectomy DME: [facility]    Unspecified diffuse connective tissue disease     no issues at this time 2017         Social History     Tobacco Use    Smoking status: Former     Packs/day: 0.25     Types: Cigarettes     Quit date: 2021     Years since quittin.0    Smokeless tobacco: Never    Tobacco comments:     smoke about 6 cigarettes    Vaping Use    Vaping Use: Never used   Substance Use Topics    Alcohol use: Yes     Comment: 1-2 per day wine    Drug use: Yes     Types: Marijuana, Inhaled     Comment: once a month,joint            Family History   Problem Relation Age of Onset    Other Mother         Lupus    Hypertension Father     Heart Disease Father     Obesity Father     Cancer Maternal Grandmother 36        breast cancer                    Review of Systems  "  Constitutional: positive for fever    HENT: negative for otalgia   Cardiovascular - denies chest pain or dyspnea  Respiratory: Positive for cough.  .  Negative for wheezing.    Neurological: Negative for headaches, dizziness   GI - denies nausea, vomiting or diarrhea   - denies dysuria, discharge  Psych - denies depression, anxiety  Neuro - denies numbness or tingling.   10 point ROS otherwise negative, except per HPI             Objective:     /70 (BP Location: Left arm, Patient Position: Sitting, BP Cuff Size: Adult)   Pulse 98   Temp 36.5 °C (97.7 °F) (Oral)   Resp 16   Ht 1.753 m (5' 9\")   Wt 71.7 kg (158 lb)   SpO2 98%       Physical Exam   Constitutional: patient is oriented to person, place, and time. Patient appears well-developed and well-nourished. No distress.   HENT:   Head: Normocephalic and atraumatic.   Right Ear: External ear normal.   Left Ear: External ear normal.   TMs normal  Nose: Mucosal edema  present. Right sinus exhibits no maxillary sinus tenderness. Left sinus exhibits no maxillary sinus tenderness.   Mouth/Throat: Mucous membranes are normal. No oral lesions.  No posterior pharyngeal erythema.  No oropharyngeal exudate or posterior oropharyngeal edema.   Eyes: Conjunctivae and EOM are normal. Pupils are equal, round, and reactive to light. Right eye exhibits no discharge. Left eye exhibits no discharge. No scleral icterus.   Neck: Normal range of motion. Neck supple. No tracheal deviation present.   Cardiovascular: Normal rate, regular rhythm and normal heart sounds.  Exam reveals no friction rub.    Pulmonary/Chest: Effort normal. No respiratory distress. Patient has no wheezes or rhonchi. Patient has no rales.    Musculoskeletal:  exhibits no edema.   Lymphadenopathy:     Patient has no cervical adenopathy.      Neurological: patient is alert and oriented to person, place, and time.   Skin: Skin is warm and dry. No rash noted. No erythema.   Psychiatric: patient  has a " normal mood and affect.  behavior is normal.   Nursing note and vitals reviewed.          Assesment/Plan:       1. Viral URI  Negative for strep, influenza    Pt refused COVID assay       rx motrin 800mg tid prn      Follow up in one week if no improvement, sooner if symptoms worsen.

## 2022-11-03 ENCOUNTER — HOSPITAL ENCOUNTER (OUTPATIENT)
Dept: RADIOLOGY | Facility: MEDICAL CENTER | Age: 42
End: 2022-11-03
Attending: NURSE PRACTITIONER
Payer: COMMERCIAL

## 2022-11-03 DIAGNOSIS — N20.0 CALCULUS OF KIDNEY: ICD-10-CM

## 2022-11-03 PROCEDURE — 74176 CT ABD & PELVIS W/O CONTRAST: CPT

## 2023-10-03 ENCOUNTER — HOSPITAL ENCOUNTER (OUTPATIENT)
Dept: RADIOLOGY | Facility: MEDICAL CENTER | Age: 43
End: 2023-10-03
Attending: NURSE PRACTITIONER
Payer: COMMERCIAL

## 2023-10-03 DIAGNOSIS — Z12.31 ENCOUNTER FOR MAMMOGRAM TO ESTABLISH BASELINE MAMMOGRAM: ICD-10-CM

## 2023-10-03 PROCEDURE — 77063 BREAST TOMOSYNTHESIS BI: CPT

## 2023-12-16 ENCOUNTER — HOSPITAL ENCOUNTER (OUTPATIENT)
Dept: LAB | Facility: MEDICAL CENTER | Age: 43
End: 2023-12-16
Attending: NURSE PRACTITIONER
Payer: COMMERCIAL

## 2023-12-16 LAB
25(OH)D3 SERPL-MCNC: 83 NG/ML (ref 30–100)
ALBUMIN SERPL BCP-MCNC: 4.6 G/DL (ref 3.2–4.9)
ALBUMIN/GLOB SERPL: 1.6 G/DL
ALP SERPL-CCNC: 76 U/L (ref 30–99)
ALT SERPL-CCNC: 15 U/L (ref 2–50)
ANION GAP SERPL CALC-SCNC: 12 MMOL/L (ref 7–16)
AST SERPL-CCNC: 12 U/L (ref 12–45)
BASOPHILS # BLD AUTO: 0.7 % (ref 0–1.8)
BASOPHILS # BLD: 0.04 K/UL (ref 0–0.12)
BILIRUB SERPL-MCNC: 0.6 MG/DL (ref 0.1–1.5)
BUN SERPL-MCNC: 13 MG/DL (ref 8–22)
CALCIUM ALBUM COR SERPL-MCNC: 9.1 MG/DL (ref 8.5–10.5)
CALCIUM SERPL-MCNC: 9.6 MG/DL (ref 8.5–10.5)
CHLORIDE SERPL-SCNC: 101 MMOL/L (ref 96–112)
CHOLEST SERPL-MCNC: 244 MG/DL (ref 100–199)
CO2 SERPL-SCNC: 25 MMOL/L (ref 20–33)
CREAT SERPL-MCNC: 0.81 MG/DL (ref 0.5–1.4)
EOSINOPHIL # BLD AUTO: 0.39 K/UL (ref 0–0.51)
EOSINOPHIL NFR BLD: 7.2 % (ref 0–6.9)
ERYTHROCYTE [DISTWIDTH] IN BLOOD BY AUTOMATED COUNT: 40.3 FL (ref 35.9–50)
ESTRADIOL SERPL-MCNC: 72.6 PG/ML
FASTING STATUS PATIENT QL REPORTED: NORMAL
FSH SERPL-ACNC: 20.5 MIU/ML
GFR SERPLBLD CREATININE-BSD FMLA CKD-EPI: 91 ML/MIN/1.73 M 2
GLOBULIN SER CALC-MCNC: 2.9 G/DL (ref 1.9–3.5)
GLUCOSE SERPL-MCNC: 97 MG/DL (ref 65–99)
HCT VFR BLD AUTO: 46.3 % (ref 37–47)
HDLC SERPL-MCNC: 71 MG/DL
HGB BLD-MCNC: 15.5 G/DL (ref 12–16)
IMM GRANULOCYTES # BLD AUTO: 0.01 K/UL (ref 0–0.11)
IMM GRANULOCYTES NFR BLD AUTO: 0.2 % (ref 0–0.9)
LDLC SERPL CALC-MCNC: 148 MG/DL
LH SERPL-ACNC: 16.4 IU/L
LYMPHOCYTES # BLD AUTO: 1.66 K/UL (ref 1–4.8)
LYMPHOCYTES NFR BLD: 30.7 % (ref 22–41)
MCH RBC QN AUTO: 32.2 PG (ref 27–33)
MCHC RBC AUTO-ENTMCNC: 33.5 G/DL (ref 32.2–35.5)
MCV RBC AUTO: 96.1 FL (ref 81.4–97.8)
MONOCYTES # BLD AUTO: 0.47 K/UL (ref 0–0.85)
MONOCYTES NFR BLD AUTO: 8.7 % (ref 0–13.4)
NEUTROPHILS # BLD AUTO: 2.84 K/UL (ref 1.82–7.42)
NEUTROPHILS NFR BLD: 52.5 % (ref 44–72)
NRBC # BLD AUTO: 0 K/UL
NRBC BLD-RTO: 0 /100 WBC (ref 0–0.2)
PLATELET # BLD AUTO: 295 K/UL (ref 164–446)
PMV BLD AUTO: 10 FL (ref 9–12.9)
POTASSIUM SERPL-SCNC: 3.9 MMOL/L (ref 3.6–5.5)
PROGEST SERPL-MCNC: 2.29 NG/ML
PROT SERPL-MCNC: 7.5 G/DL (ref 6–8.2)
RBC # BLD AUTO: 4.82 M/UL (ref 4.2–5.4)
SODIUM SERPL-SCNC: 138 MMOL/L (ref 135–145)
T3FREE SERPL-MCNC: 3.48 PG/ML (ref 2–4.4)
T4 FREE SERPL-MCNC: 1.05 NG/DL (ref 0.93–1.7)
THYROPEROXIDASE AB SERPL-ACNC: <9 IU/ML (ref 0–9)
TRIGL SERPL-MCNC: 124 MG/DL (ref 0–149)
TSH SERPL DL<=0.005 MIU/L-ACNC: 1.12 UIU/ML (ref 0.38–5.33)
WBC # BLD AUTO: 5.4 K/UL (ref 4.8–10.8)

## 2023-12-16 PROCEDURE — 86376 MICROSOMAL ANTIBODY EACH: CPT

## 2023-12-16 PROCEDURE — 84481 FREE ASSAY (FT-3): CPT

## 2023-12-16 PROCEDURE — 85025 COMPLETE CBC W/AUTO DIFF WBC: CPT

## 2023-12-16 PROCEDURE — 83002 ASSAY OF GONADOTROPIN (LH): CPT

## 2023-12-16 PROCEDURE — 80053 COMPREHEN METABOLIC PANEL: CPT

## 2023-12-16 PROCEDURE — 84443 ASSAY THYROID STIM HORMONE: CPT

## 2023-12-16 PROCEDURE — 82670 ASSAY OF TOTAL ESTRADIOL: CPT

## 2023-12-16 PROCEDURE — 84144 ASSAY OF PROGESTERONE: CPT

## 2023-12-16 PROCEDURE — 84439 ASSAY OF FREE THYROXINE: CPT

## 2023-12-16 PROCEDURE — 36415 COLL VENOUS BLD VENIPUNCTURE: CPT

## 2023-12-16 PROCEDURE — 80061 LIPID PANEL: CPT

## 2023-12-16 PROCEDURE — 83001 ASSAY OF GONADOTROPIN (FSH): CPT

## 2023-12-16 PROCEDURE — 82306 VITAMIN D 25 HYDROXY: CPT

## 2024-09-07 ENCOUNTER — HOSPITAL ENCOUNTER (OUTPATIENT)
Dept: LAB | Facility: MEDICAL CENTER | Age: 44
End: 2024-09-07
Attending: NURSE PRACTITIONER
Payer: COMMERCIAL

## 2024-09-07 LAB
ALBUMIN SERPL BCP-MCNC: 4.1 G/DL (ref 3.2–4.9)
ALBUMIN/GLOB SERPL: 1.6 G/DL
ALP SERPL-CCNC: 80 U/L (ref 30–99)
ALT SERPL-CCNC: 14 U/L (ref 2–50)
ANION GAP SERPL CALC-SCNC: 14 MMOL/L (ref 7–16)
AST SERPL-CCNC: 15 U/L (ref 12–45)
BASOPHILS # BLD AUTO: 0.9 % (ref 0–1.8)
BASOPHILS # BLD: 0.06 K/UL (ref 0–0.12)
BILIRUB SERPL-MCNC: 0.4 MG/DL (ref 0.1–1.5)
BUN SERPL-MCNC: 12 MG/DL (ref 8–22)
CALCIUM ALBUM COR SERPL-MCNC: 9.4 MG/DL (ref 8.5–10.5)
CALCIUM SERPL-MCNC: 9.5 MG/DL (ref 8.5–10.5)
CHLORIDE SERPL-SCNC: 104 MMOL/L (ref 96–112)
CO2 SERPL-SCNC: 21 MMOL/L (ref 20–33)
CREAT SERPL-MCNC: 0.63 MG/DL (ref 0.5–1.4)
EOSINOPHIL # BLD AUTO: 0.44 K/UL (ref 0–0.51)
EOSINOPHIL NFR BLD: 6.8 % (ref 0–6.9)
ERYTHROCYTE [DISTWIDTH] IN BLOOD BY AUTOMATED COUNT: 40.6 FL (ref 35.9–50)
FASTING STATUS PATIENT QL REPORTED: NORMAL
GFR SERPLBLD CREATININE-BSD FMLA CKD-EPI: 110 ML/MIN/1.73 M 2
GLOBULIN SER CALC-MCNC: 2.6 G/DL (ref 1.9–3.5)
GLUCOSE SERPL-MCNC: 94 MG/DL (ref 65–99)
HCT VFR BLD AUTO: 43.8 % (ref 37–47)
HGB BLD-MCNC: 14.8 G/DL (ref 12–16)
IMM GRANULOCYTES # BLD AUTO: 0.01 K/UL (ref 0–0.11)
IMM GRANULOCYTES NFR BLD AUTO: 0.2 % (ref 0–0.9)
LYMPHOCYTES # BLD AUTO: 1.89 K/UL (ref 1–4.8)
LYMPHOCYTES NFR BLD: 29.1 % (ref 22–41)
MCH RBC QN AUTO: 32.5 PG (ref 27–33)
MCHC RBC AUTO-ENTMCNC: 33.8 G/DL (ref 32.2–35.5)
MCV RBC AUTO: 96.3 FL (ref 81.4–97.8)
MONOCYTES # BLD AUTO: 0.63 K/UL (ref 0–0.85)
MONOCYTES NFR BLD AUTO: 9.7 % (ref 0–13.4)
NEUTROPHILS # BLD AUTO: 3.47 K/UL (ref 1.82–7.42)
NEUTROPHILS NFR BLD: 53.3 % (ref 44–72)
NRBC # BLD AUTO: 0 K/UL
NRBC BLD-RTO: 0 /100 WBC (ref 0–0.2)
PLATELET # BLD AUTO: 295 K/UL (ref 164–446)
PMV BLD AUTO: 10 FL (ref 9–12.9)
POTASSIUM SERPL-SCNC: 4.1 MMOL/L (ref 3.6–5.5)
PROT SERPL-MCNC: 6.7 G/DL (ref 6–8.2)
RBC # BLD AUTO: 4.55 M/UL (ref 4.2–5.4)
SODIUM SERPL-SCNC: 139 MMOL/L (ref 135–145)
T4 FREE SERPL-MCNC: 1.03 NG/DL (ref 0.93–1.7)
TESTOST SERPL-MCNC: 5 NG/DL (ref 9–75)
TSH SERPL-ACNC: 1.45 UIU/ML (ref 0.35–5.5)
WBC # BLD AUTO: 6.5 K/UL (ref 4.8–10.8)

## 2024-09-07 PROCEDURE — 85025 COMPLETE CBC W/AUTO DIFF WBC: CPT

## 2024-09-07 PROCEDURE — 80053 COMPREHEN METABOLIC PANEL: CPT

## 2024-09-07 PROCEDURE — 36415 COLL VENOUS BLD VENIPUNCTURE: CPT

## 2024-09-07 PROCEDURE — 84403 ASSAY OF TOTAL TESTOSTERONE: CPT

## 2024-09-07 PROCEDURE — 84443 ASSAY THYROID STIM HORMONE: CPT

## 2024-09-07 PROCEDURE — 84439 ASSAY OF FREE THYROXINE: CPT

## 2024-11-22 ENCOUNTER — APPOINTMENT (RX ONLY)
Dept: URBAN - METROPOLITAN AREA CLINIC 22 | Facility: CLINIC | Age: 44
Setting detail: DERMATOLOGY
End: 2024-11-22

## 2024-11-22 ENCOUNTER — APPOINTMENT (OUTPATIENT)
Dept: RADIOLOGY | Facility: MEDICAL CENTER | Age: 44
End: 2024-11-22
Attending: NURSE PRACTITIONER
Payer: COMMERCIAL

## 2024-11-22 DIAGNOSIS — Z12.31 VISIT FOR SCREENING MAMMOGRAM: ICD-10-CM

## 2024-11-22 DIAGNOSIS — Z71.89 OTHER SPECIFIED COUNSELING: ICD-10-CM

## 2024-11-22 DIAGNOSIS — L82.1 OTHER SEBORRHEIC KERATOSIS: ICD-10-CM

## 2024-11-22 DIAGNOSIS — L91.8 OTHER HYPERTROPHIC DISORDERS OF THE SKIN: ICD-10-CM

## 2024-11-22 DIAGNOSIS — L81.4 OTHER MELANIN HYPERPIGMENTATION: ICD-10-CM

## 2024-11-22 DIAGNOSIS — D22 MELANOCYTIC NEVI: ICD-10-CM

## 2024-11-22 PROBLEM — D22.5 MELANOCYTIC NEVI OF TRUNK: Status: ACTIVE | Noted: 2024-11-22

## 2024-11-22 PROBLEM — D48.5 NEOPLASM OF UNCERTAIN BEHAVIOR OF SKIN: Status: ACTIVE | Noted: 2024-11-22

## 2024-11-22 PROCEDURE — ? SUNSCREEN TREATMENT REGIMEN

## 2024-11-22 PROCEDURE — 11103 TANGNTL BX SKIN EA SEP/ADDL: CPT

## 2024-11-22 PROCEDURE — ? BIOPSY BY SHAVE METHOD

## 2024-11-22 PROCEDURE — 99203 OFFICE O/P NEW LOW 30 MIN: CPT | Mod: 25

## 2024-11-22 PROCEDURE — 11102 TANGNTL BX SKIN SINGLE LES: CPT

## 2024-11-22 PROCEDURE — ? COUNSELING

## 2024-11-22 ASSESSMENT — LOCATION SIMPLE DESCRIPTION DERM
LOCATION SIMPLE: UPPER BACK
LOCATION SIMPLE: RIGHT FOREHEAD
LOCATION SIMPLE: RIGHT HAND
LOCATION SIMPLE: INFERIOR FOREHEAD
LOCATION SIMPLE: RIGHT UPPER BACK
LOCATION SIMPLE: LEFT BREAST
LOCATION SIMPLE: LEFT UPPER BACK
LOCATION SIMPLE: LEFT FOREARM
LOCATION SIMPLE: RIGHT FOREARM

## 2024-11-22 ASSESSMENT — LOCATION ZONE DERM
LOCATION ZONE: ARM
LOCATION ZONE: TRUNK
LOCATION ZONE: FACE
LOCATION ZONE: HAND

## 2024-11-22 ASSESSMENT — LOCATION DETAILED DESCRIPTION DERM
LOCATION DETAILED: LEFT MID-UPPER BACK
LOCATION DETAILED: LEFT VENTRAL PROXIMAL FOREARM
LOCATION DETAILED: RIGHT INFERIOR UPPER BACK
LOCATION DETAILED: SUPERIOR THORACIC SPINE
LOCATION DETAILED: INFERIOR MID FOREHEAD
LOCATION DETAILED: RIGHT VENTRAL PROXIMAL FOREARM
LOCATION DETAILED: RIGHT SUPERIOR FOREHEAD
LOCATION DETAILED: RIGHT RADIAL DORSAL HAND
LOCATION DETAILED: LEFT NIPPLE

## 2024-11-22 NOTE — PROCEDURE: BIOPSY BY SHAVE METHOD
Detail Level: Detailed
Depth Of Biopsy: dermis
Was A Bandage Applied: Yes
Size Of Lesion In Cm: 0.4
X Size Of Lesion In Cm: 0
Biopsy Type: H and E
Biopsy Method: Personna blade
Anesthesia Type: 1% lidocaine without epinephrine
Anesthesia Volume In Cc: 1.5
Hemostasis: Aluminum Chloride
Wound Care: Petrolatum
Dressing: pressure dressing with telfa
Destruction After The Procedure: No
Type Of Destruction Used: Curettage
Curettage Text: The wound bed was treated with curettage after the biopsy was performed.
Cryotherapy Text: The wound bed was treated with cryotherapy after the biopsy was performed.
Electrodesiccation Text: The wound bed was treated with electrodesiccation after the biopsy was performed.
Electrodesiccation And Curettage Text: The wound bed was treated with electrodesiccation and curettage after the biopsy was performed.
Silver Nitrate Text: The wound bed was treated with silver nitrate after the biopsy was performed.
Lab: [id number]
Lab Facility: 
Consent: Written consent was obtained and risks were reviewed including but not limited to scarring, infection, bleeding, scabbing, incomplete removal, nerve damage and allergy to anesthesia.
Post-Care Instructions: I reviewed with the patient in detail post-care instructions. Patient is to keep the biopsy site dry overnight. Gentle cleansing daily.  Apply petroleum ointment daily until healed. Patient may apply hydrogen peroxide soaks to remove any crusting.
Notification Instructions: Patient will be notified of biopsy results. However, patient instructed to call the office if not contacted within 2 weeks.
Billing Type: Third-Party Bill
Information: Selecting Yes will display possible errors in your note based on the variables you have selected. This validation is only offered as a suggestion for you. PLEASE NOTE THAT THE VALIDATION TEXT WILL BE REMOVED WHEN YOU FINALIZE YOUR NOTE. IF YOU WANT TO FAX A PRELIMINARY NOTE YOU WILL NEED TO TOGGLE THIS TO 'NO' IF YOU DO NOT WANT IT IN YOUR FAXED NOTE.
Size Of Lesion In Cm: 0.5
Anesthesia Volume In Cc: 0.7
Hemostasis: Aluminum Chloride and Electrocautery

## 2024-12-10 ENCOUNTER — HOSPITAL ENCOUNTER (OUTPATIENT)
Dept: RADIOLOGY | Facility: MEDICAL CENTER | Age: 44
End: 2024-12-10
Attending: NURSE PRACTITIONER
Payer: COMMERCIAL

## 2024-12-10 DIAGNOSIS — Z12.31 VISIT FOR SCREENING MAMMOGRAM: ICD-10-CM

## 2024-12-10 PROCEDURE — 77067 SCR MAMMO BI INCL CAD: CPT

## 2025-02-21 ENCOUNTER — HOSPITAL ENCOUNTER (OUTPATIENT)
Dept: RADIOLOGY | Facility: MEDICAL CENTER | Age: 45
End: 2025-02-21
Payer: COMMERCIAL

## 2025-02-21 ENCOUNTER — OFFICE VISIT (OUTPATIENT)
Dept: URGENT CARE | Facility: PHYSICIAN GROUP | Age: 45
End: 2025-02-21
Payer: COMMERCIAL

## 2025-02-21 VITALS
SYSTOLIC BLOOD PRESSURE: 130 MMHG | RESPIRATION RATE: 18 BRPM | WEIGHT: 178.3 LBS | HEIGHT: 69 IN | TEMPERATURE: 97.2 F | HEART RATE: 88 BPM | OXYGEN SATURATION: 100 % | DIASTOLIC BLOOD PRESSURE: 88 MMHG | BODY MASS INDEX: 26.41 KG/M2

## 2025-02-21 DIAGNOSIS — R10.84 GENERALIZED ABDOMINAL PAIN: ICD-10-CM

## 2025-02-21 DIAGNOSIS — R11.2 NAUSEA AND VOMITING, UNSPECIFIED VOMITING TYPE: ICD-10-CM

## 2025-02-21 DIAGNOSIS — R68.83 CHILLS: ICD-10-CM

## 2025-02-21 LAB
APPEARANCE UR: CLEAR
BILIRUB UR STRIP-MCNC: NEGATIVE MG/DL
COLOR UR AUTO: NORMAL
FLUAV RNA SPEC QL NAA+PROBE: NEGATIVE
FLUBV RNA SPEC QL NAA+PROBE: NEGATIVE
GLUCOSE UR STRIP.AUTO-MCNC: NEGATIVE MG/DL
KETONES UR STRIP.AUTO-MCNC: NEGATIVE MG/DL
LEUKOCYTE ESTERASE UR QL STRIP.AUTO: NEGATIVE
NITRITE UR QL STRIP.AUTO: NEGATIVE
PH UR STRIP.AUTO: 6.5 [PH] (ref 5–8)
PROT UR QL STRIP: NORMAL MG/DL
RBC UR QL AUTO: NEGATIVE
RSV RNA SPEC QL NAA+PROBE: NEGATIVE
S PYO DNA SPEC NAA+PROBE: NOT DETECTED
SARS-COV-2 RNA RESP QL NAA+PROBE: NEGATIVE
SP GR UR STRIP.AUTO: 1.01
UROBILINOGEN UR STRIP-MCNC: 0.2 MG/DL

## 2025-02-21 PROCEDURE — 81002 URINALYSIS NONAUTO W/O SCOPE: CPT

## 2025-02-21 PROCEDURE — 74018 RADEX ABDOMEN 1 VIEW: CPT

## 2025-02-21 PROCEDURE — 0241U POCT CEPHEID COV-2, FLU A/B, RSV - PCR: CPT

## 2025-02-21 PROCEDURE — 3079F DIAST BP 80-89 MM HG: CPT

## 2025-02-21 PROCEDURE — 99214 OFFICE O/P EST MOD 30 MIN: CPT

## 2025-02-21 PROCEDURE — 87651 STREP A DNA AMP PROBE: CPT

## 2025-02-21 PROCEDURE — 3075F SYST BP GE 130 - 139MM HG: CPT

## 2025-02-21 RX ORDER — ONDANSETRON 4 MG/1
4 TABLET, ORALLY DISINTEGRATING ORAL ONCE
Status: COMPLETED | OUTPATIENT
Start: 2025-02-21 | End: 2025-02-21

## 2025-02-21 RX ORDER — ESTRADIOL 0.05 MG/D
1 PATCH, EXTENDED RELEASE TRANSDERMAL
COMMUNITY

## 2025-02-21 RX ORDER — ONDANSETRON 4 MG/1
4 TABLET, ORALLY DISINTEGRATING ORAL EVERY 6 HOURS PRN
Qty: 15 TABLET | Refills: 0 | Status: SHIPPED | OUTPATIENT
Start: 2025-02-21

## 2025-02-21 RX ADMIN — ONDANSETRON 4 MG: 4 TABLET, ORALLY DISINTEGRATING ORAL at 14:28

## 2025-02-21 ASSESSMENT — FIBROSIS 4 INDEX: FIB4 SCORE: 0.63

## 2025-02-21 NOTE — PROGRESS NOTES
Subjective:   Tiana Lopez is a 44 y.o. female who presents for GI Problem (Vomited this morning, sharp pain in stomach, x1 day.)      HPI:    Patient presents to urgent care with concerns of nausea, vomiting, diarrhea  Woke up this morning at 3 am and had recurrence of nausea, vomiting, diarrhea until 9 am.   She is accompanied by her significant other.  Reports bilateral mid abdominal pain.  States stool is dark at first, is now basically water.  She reports bile colored emesis.  Denies presence of blood in stool and/or emesis.  Nauseated currently  Reports subjective fever, chills, body ahces  Reports no appetite, tolerating fluids.  Has not traveled anywhere, denies new or undercooked food, meals.  Reports history of IBS, renal stones  Colonscopy with large poly removal, rechecked and does not need to be seen in 2 years  Has pmh of hysterectomy  Denies known sick contacts  Denies rhinorrhea, nasal congestion, sore throat, cough  Denies dysuria, hematuria, urinary urgency, frequency      ROS As above in HPI    Medications:    Current Outpatient Medications on File Prior to Visit   Medication Sig Dispense Refill    estradiol (VIVELLE DOT) 0.05 MG/24HR PATCH BIWEEKLY Place 1 Patch on the skin two times a week.      DULoxetine (CYMBALTA) 60 MG Cap DR Particles delayed-release capsule Take 1 Capsule by mouth every morning. 90 Capsule 3    Multiple Vitamin (MULTI-VITAMIN DAILY PO) Take  by mouth.      Non Formulary Request collidial silver- Taking 17 sprays sublingual daily.      Saccharomyces boulardii (PROBIOTIC) 250 MG Cap Take  by mouth.      diclofenac DR (VOLTAREN) 25 MG Tablet Delayed Response Take 50 mg by mouth 2 times a day.      VITAMIN D PO Place  under the tongue.       No current facility-administered medications on file prior to visit.        Allergies:   Chloraprep one step, Codeine, Tea tree oil, Tramadol, Turmeric, Mobic, Peppermint oil, Other misc, Senokot, Sulfa drugs, Tape, and  "Tree nuts food allergy    Problem List:   Patient Active Problem List   Diagnosis    Migraine without aura or status migrainosus    Connective tissue disease, undifferentiated (HCC)    Mild episode of recurrent major depressive disorder (HCC)    Low serum vitamin D    Kidney stones, calcium oxalate    Sleep apnea with use of continuous positive airway pressure (CPAP)    Autoimmune disease (HCC)    Anxiety state    Gastroesophageal reflux disease    Irritable bowel syndrome    Cigarette smoker    Chronic fatigue    Low T4    Neuralgia    Right thigh pain    Constipation    Primary hypertension        Surgical History:  Past Surgical History:   Procedure Laterality Date    HYSTERECTOMY ROBOTIC SINGLE SITE  2/6/2017    Procedure: HYSTERECTOMY ROBOTIC SINGLE SITE BILATERAL SALPINGECTOMY ;  Surgeon: Zaynab Dailey M.D.;  Location: SURGERY [facility];  Service:     CYSTOSCOPY  2/6/2017    Procedure: CYSTOSCOPY;  Surgeon: Zaynab Dailey M.D.;  Location: SURGERY [facility];  Service:        Past Social Hx:   Social History     Tobacco Use    Smoking status: Former     Current packs/day: 0.00     Types: Cigarettes     Quit date: 9/13/2021     Years since quitting: 3.4     Passive exposure: Past    Smokeless tobacco: Never    Tobacco comments:     smoke about 6 cigarettes    Vaping Use    Vaping status: Never Used   Substance Use Topics    Alcohol use: Yes     Comment: 1-2 per day wine    Drug use: Yes     Types: Marijuana, Inhaled     Comment: once a month,joint           Problem list, medications, and allergies reviewed by myself today in Epic.     Objective:     /88 (BP Location: Right arm, Patient Position: Sitting, BP Cuff Size: Adult)   Pulse 88   Temp 36.2 °C (97.2 °F)   Resp 18   Ht 1.753 m (5' 9\")   Wt 80.9 kg (178 lb 4.8 oz)   LMP 01/29/2017 (Exact Date)   SpO2 100%   BMI 26.33 kg/m²     Physical Exam  Vitals and nursing note reviewed.   Constitutional:       General: She is not in " acute distress.     Appearance: Normal appearance. She is not ill-appearing or diaphoretic.   HENT:      Head: Normocephalic.      Right Ear: Tympanic membrane and ear canal normal.      Left Ear: Tympanic membrane and ear canal normal.      Nose: Nose normal.      Mouth/Throat:      Mouth: Mucous membranes are moist.      Pharynx: Oropharynx is clear.   Cardiovascular:      Rate and Rhythm: Normal rate and regular rhythm.      Heart sounds: Normal heart sounds.   Pulmonary:      Effort: Pulmonary effort is normal. No respiratory distress.      Breath sounds: Normal breath sounds. No stridor. No decreased breath sounds, wheezing, rhonchi or rales.   Chest:      Chest wall: No tenderness.   Abdominal:      General: Abdomen is flat. Bowel sounds are decreased. There is no distension.      Palpations: Abdomen is soft. There is no hepatomegaly, splenomegaly, mass or pulsatile mass.      Tenderness: There is abdominal tenderness in the left upper quadrant. There is no right CVA tenderness, left CVA tenderness, guarding or rebound. Negative signs include Aranda's sign, Rovsing's sign and McBurney's sign.      Hernia: No hernia is present.   Musculoskeletal:      Cervical back: No rigidity or tenderness.   Lymphadenopathy:      Cervical: No cervical adenopathy.   Skin:     General: Skin is warm and dry.      Capillary Refill: Capillary refill takes less than 2 seconds.      Findings: No rash.   Neurological:      Mental Status: She is alert and oriented to person, place, and time.         Assessment/Plan:       Results for orders placed or performed in visit on 02/21/25   POCT Urinalysis    Collection Time: 02/21/25  2:35 PM   Result Value Ref Range    POC Color DARK YELLOW Negative    POC Appearance CLEAR Negative    POC Glucose NEGATIVE Negative mg/dL    POC Bilirubin NEGATIVE Negative mg/dL    POC Ketones NEGATIVE Negative mg/dL    POC Specific Gravity 1.015 <1.005 - >1.030    POC Blood NEGATIVE Negative    POC Urine PH  "6.5 5.0 - 8.0    POC Protein TRACE Negative mg/dL    POC Urobiligen 0.2 Negative (0.2) mg/dL    POC Nitrites NEGATIVE Negative    POC Leukocyte Esterase NEGATIVE Negative   POCT CoV-2, Flu A/B, RSV by PCR    Collection Time: 02/21/25  2:36 PM   Result Value Ref Range    SARS-CoV-2 by PCR Negative Negative, Invalid    Influenza virus A RNA Negative Negative, Invalid    Influenza virus B, PCR Negative Negative, Invalid    RSV, PCR Negative Negative, Invalid   POCT CEPHEID GROUP A STREP - PCR    Collection Time: 02/21/25  2:36 PM   Result Value Ref Range    POC Group A Strep, PCR Not Detected Not Detected, Invalid       Diagnosis and associated orders:   1. Nausea and vomiting, unspecified vomiting type  - [id number] VIEW; Future  - ondansetron (Zofran ODT) dispertab 4 mg  - POCT Urinalysis  - ondansetron (ZOFRAN ODT) 4 MG TABLET DISPERSIBLE; Take 1 Tablet by mouth every 6 hours as needed for Nausea/Vomiting for up to 15 doses.  Dispense: 15 Tablet; Refill: 0    2. Generalized abdominal pain  - [id number] VIEW; Future  - POCT Urinalysis    3. Chills  - POCT CoV-2, Flu A/B, RSV by PCR  - POCT CEPHEID GROUP A STREP - PCR  - POCT Urinalysis    Other orders  - estradiol (VIVELLE DOT) 0.05 MG/24HR PATCH BIWEEKLY; Place 1 Patch on the skin two times a week.        Comments/MDM:     Discussed possible viral etiology vs IBS \"flare\"  UA: -blood, -le, -nitrite; uti unlikely  Patient tested negative for covid, influenza, rsv, and strep in office.   Nausea controlled with zofran in office, will order for home use  She is tolerating fluid intake in office. VSS.   Has very mild LUQ abdominal discomfort, KUB is negative for SBO.   Denies travel, new/undercooked/raw food. Denies sick contacts.   Recommend oral hydration, rest, liquid diet to promote bowel rest, probiotics.   Strict return to ER for worsening symptoms.   Follow up with PCP advised.       Return to clinic or go to ED if symptoms worsen or persist. Indications for ED " discussed at length. Patient/Parent/Guardian voices understanding. Follow-up with your primary care provider in 3-5 days. Red flag symptoms discussed. All side effects of medication discussed including allergic response, GI upset, tendon injury, rash, sedation etc.    Please note that this dictation was created using voice recognition software. I have made a reasonable attempt to correct obvious errors, but I expect that there are errors of grammar and possibly content that I did not discover before finalizing the note.    This note was electronically signed by RADHA Butcher

## 2025-02-27 RX ORDER — ONDANSETRON 4 MG/1
4 TABLET, ORALLY DISINTEGRATING ORAL EVERY 8 HOURS PRN
Qty: 10 TABLET | Refills: 0 | Status: SHIPPED | OUTPATIENT
Start: 2025-02-27

## 2025-05-01 ENCOUNTER — HOSPITAL ENCOUNTER (OUTPATIENT)
Dept: RADIOLOGY | Facility: MEDICAL CENTER | Age: 45
End: 2025-05-01
Attending: NURSE PRACTITIONER
Payer: COMMERCIAL

## 2025-05-01 ENCOUNTER — HOSPITAL ENCOUNTER (OUTPATIENT)
Dept: LAB | Facility: MEDICAL CENTER | Age: 45
End: 2025-05-01
Attending: NURSE PRACTITIONER
Payer: COMMERCIAL

## 2025-05-01 DIAGNOSIS — R10.9 STOMACH ACHE: ICD-10-CM

## 2025-05-01 LAB
BASOPHILS # BLD AUTO: 0.7 % (ref 0–1.8)
BASOPHILS # BLD: 0.05 K/UL (ref 0–0.12)
EOSINOPHIL # BLD AUTO: 0.42 K/UL (ref 0–0.51)
EOSINOPHIL NFR BLD: 5.5 % (ref 0–6.9)
ERYTHROCYTE [DISTWIDTH] IN BLOOD BY AUTOMATED COUNT: 44.8 FL (ref 35.9–50)
EST. AVERAGE GLUCOSE BLD GHB EST-MCNC: 108 MG/DL
HBA1C MFR BLD: 5.4 % (ref 4–5.6)
HCT VFR BLD AUTO: 44.8 % (ref 37–47)
HGB BLD-MCNC: 15.3 G/DL (ref 12–16)
IMM GRANULOCYTES # BLD AUTO: 0.02 K/UL (ref 0–0.11)
IMM GRANULOCYTES NFR BLD AUTO: 0.3 % (ref 0–0.9)
LYMPHOCYTES # BLD AUTO: 1.71 K/UL (ref 1–4.8)
LYMPHOCYTES NFR BLD: 22.6 % (ref 22–41)
MCH RBC QN AUTO: 33.6 PG (ref 27–33)
MCHC RBC AUTO-ENTMCNC: 34.2 G/DL (ref 32.2–35.5)
MCV RBC AUTO: 98.5 FL (ref 81.4–97.8)
MONOCYTES # BLD AUTO: 0.64 K/UL (ref 0–0.85)
MONOCYTES NFR BLD AUTO: 8.5 % (ref 0–13.4)
NEUTROPHILS # BLD AUTO: 4.73 K/UL (ref 1.82–7.42)
NEUTROPHILS NFR BLD: 62.4 % (ref 44–72)
NRBC # BLD AUTO: 0 K/UL
NRBC BLD-RTO: 0 /100 WBC (ref 0–0.2)
PLATELET # BLD AUTO: 311 K/UL (ref 164–446)
PMV BLD AUTO: 10.1 FL (ref 9–12.9)
RBC # BLD AUTO: 4.55 M/UL (ref 4.2–5.4)
WBC # BLD AUTO: 7.6 K/UL (ref 4.8–10.8)

## 2025-05-01 PROCEDURE — 82306 VITAMIN D 25 HYDROXY: CPT

## 2025-05-01 PROCEDURE — 85025 COMPLETE CBC W/AUTO DIFF WBC: CPT

## 2025-05-01 PROCEDURE — 83036 HEMOGLOBIN GLYCOSYLATED A1C: CPT

## 2025-05-01 PROCEDURE — 84443 ASSAY THYROID STIM HORMONE: CPT

## 2025-05-01 PROCEDURE — 74176 CT ABD & PELVIS W/O CONTRAST: CPT

## 2025-05-01 PROCEDURE — 80053 COMPREHEN METABOLIC PANEL: CPT

## 2025-05-01 PROCEDURE — 36415 COLL VENOUS BLD VENIPUNCTURE: CPT

## 2025-05-01 PROCEDURE — 80061 LIPID PANEL: CPT

## 2025-05-02 LAB
25(OH)D3 SERPL-MCNC: 60 NG/ML (ref 30–100)
ALBUMIN SERPL BCP-MCNC: 4.4 G/DL (ref 3.2–4.9)
ALBUMIN/GLOB SERPL: 1.6 G/DL
ALP SERPL-CCNC: 73 U/L (ref 30–99)
ALT SERPL-CCNC: 22 U/L (ref 2–50)
ANION GAP SERPL CALC-SCNC: 12 MMOL/L (ref 7–16)
AST SERPL-CCNC: 21 U/L (ref 12–45)
BILIRUB SERPL-MCNC: 0.5 MG/DL (ref 0.1–1.5)
BUN SERPL-MCNC: 11 MG/DL (ref 8–22)
CALCIUM ALBUM COR SERPL-MCNC: 9.1 MG/DL (ref 8.5–10.5)
CALCIUM SERPL-MCNC: 9.4 MG/DL (ref 8.5–10.5)
CHLORIDE SERPL-SCNC: 102 MMOL/L (ref 96–112)
CHOLEST SERPL-MCNC: 237 MG/DL (ref 100–199)
CO2 SERPL-SCNC: 24 MMOL/L (ref 20–33)
CREAT SERPL-MCNC: 0.73 MG/DL (ref 0.5–1.4)
FASTING STATUS PATIENT QL REPORTED: NORMAL
GFR SERPLBLD CREATININE-BSD FMLA CKD-EPI: 102 ML/MIN/1.73 M 2
GLOBULIN SER CALC-MCNC: 2.7 G/DL (ref 1.9–3.5)
GLUCOSE SERPL-MCNC: 106 MG/DL (ref 65–99)
HDLC SERPL-MCNC: 71 MG/DL
LDLC SERPL CALC-MCNC: 149 MG/DL
POTASSIUM SERPL-SCNC: 4.2 MMOL/L (ref 3.6–5.5)
PROT SERPL-MCNC: 7.1 G/DL (ref 6–8.2)
SODIUM SERPL-SCNC: 138 MMOL/L (ref 135–145)
TRIGL SERPL-MCNC: 85 MG/DL (ref 0–149)
TSH SERPL-ACNC: 1.57 UIU/ML (ref 0.38–5.33)

## 2025-05-08 NOTE — PROGRESS NOTES
Backfill bladder done prior to purdy cath removal as instructed. Pt voided all 300ml right away without any problem.    Faxed over order to [facility] @ fax # given below.    Closing encounter

## (undated) DEVICE — PAD OR TABLE DA VINCI 2IN X 20IN X 72IN - (12EA/CA)

## (undated) DEVICE — CANNULA SEAL 8.5-13MM (10/BX)

## (undated) DEVICE — GLOVE BIOGEL INDICATOR SZ 7.5 SURGICAL PF LTX - (50PR/BX 4BX/CA)

## (undated) DEVICE — ROBOTIC SURGERY SERVICES

## (undated) DEVICE — SUCTION INSTRUMENT YANKAUER BULBOUS TIP W/O VENT (50EA/CA)

## (undated) DEVICE — PORT ACCESS MINISINGLE SITE GELPOINT (1/EA)

## (undated) DEVICE — GOWN WARMING STANDARD FLEX - (30/CA)

## (undated) DEVICE — KIT ANESTHESIA W/CIRCUIT & 3/LT BAG W/FILTER (20EA/CA)

## (undated) DEVICE — ARMREST CRADLE FOAM - (2PR/PK 12PR/CA)

## (undated) DEVICE — CANISTER SUCTION 3000ML MECHANICAL FILTER AUTO SHUTOFF MEDI-VAC NONSTERILE LF DISP  (40EA/CA)

## (undated) DEVICE — SUTURE 4-0 VICRYL PLUS FS-2 - 27 INCH (36/BX)

## (undated) DEVICE — CORDS BIPOLAR COAGULATION - 12FT STERILE DISP. (10EA/BX)

## (undated) DEVICE — SET IRRIGATION CYSTOSCOPY TUBE L80 IN (20EA/CA)

## (undated) DEVICE — NEEDLE DRIVER MEGA SUTURECUT DA VINCI 15X'S REUSABLE

## (undated) DEVICE — UTERINE MANIP V-CARE STANDARD DAVINCI (8EA/CA)

## (undated) DEVICE — SLEEVE, VASO, THIGH, MED

## (undated) DEVICE — ELECTRODE 850 FOAM ADHESIVE - HYDROGEL RADIOTRNSPRNT (50/PK)

## (undated) DEVICE — TUBING LAPAROSCOPIC PLUME DEVICE (10EA/CA)

## (undated) DEVICE — GUIDE TRACHE TUBE INTUBATING STYLET 5.0-10.0MM 14FR (20EA/PK)

## (undated) DEVICE — GLOVE BIOGEL SZ 8.5 SURGICAL PF LTX - (50PR/BX 4BX/CA)

## (undated) DEVICE — KIT ROOM DECONTAMINATION

## (undated) DEVICE — SET EXTENSION WITH 2 PORTS (48EA/CA) ***PART #2C8610 IS A SUBSTITUTE*****

## (undated) DEVICE — SUTURE 0 VICRYL PLUS CT-2 - 27 INCH (36/BX)

## (undated) DEVICE — MASK ANESTHESIA ADULT  - (100/CA)

## (undated) DEVICE — WATER IRRIGATION STERILE 1000ML (12EA/CA)

## (undated) DEVICE — SUTURE 2-0 20CM STRATAFIX SPIRAL SH NEEDLE (12/BX)

## (undated) DEVICE — SUTURE 3-0 MONOCRYL PLUS PS-1 - 27 INCH (36/BX)

## (undated) DEVICE — BAG RETRIEVAL 5MM (10EA/BX)

## (undated) DEVICE — NEPTUNE 4 PORT MANIFOLD - (20/PK)

## (undated) DEVICE — SUTURE GENERAL

## (undated) DEVICE — DRAPE 3 ARM DA VANCI SI - (5EA/CA)

## (undated) DEVICE — TUBE CONNECT SUCTION CLEAR 120 X 1/4" (50EA/CA)"

## (undated) DEVICE — LACTATED RINGERS INJ 1000 ML - (14EA/CA 60CA/PF)

## (undated) DEVICE — TUBE E-T HI-LO CUFF 7.0MM (10EA/PK)

## (undated) DEVICE — SET SUCTION/IRRIGATION WITH DISPOSABLE TIP (6/CA )PART #0250-070-520 IS A SUB

## (undated) DEVICE — NEEDLE INSFL 120MM 14GA VRRS - (20/BX)

## (undated) DEVICE — BLADE SURGICAL #11 - (50/BX)

## (undated) DEVICE — Device

## (undated) DEVICE — SENSOR SPO2 NEO LNCS ADHESIVE (20/BX) SEE USER NOTES

## (undated) DEVICE — GLOVE BIOGEL SZ 6.5 SURGICAL PF LTX (50PR/BX 4BX/CA)

## (undated) DEVICE — WATER IRRIG. STER 3000 ML - (4/CA)

## (undated) DEVICE — SEAL CANNULA DA VINCI - (10/BX)

## (undated) DEVICE — DRAPE INSTRUMENT ARM DA VINCI - (20/BX)

## (undated) DEVICE — GLOVE BIOGEL SZ 7.5 SURGICAL PF LTX - (50PR/BX 4BX/CA)

## (undated) DEVICE — FORCEP BIPOLAR MARYLAND DA VINCI 10X'S REUSABLE (10UN/EA)

## (undated) DEVICE — GLOVE BIOGEL PI INDICATOR SZ 6.5 SURGICAL PF LF - (50/BX 4BX/CA)

## (undated) DEVICE — SET LEADWIRE 5 LEAD BEDSIDE DISPOSABLE ECG (1SET OF 5/EA)

## (undated) DEVICE — GLOVE BIOGEL SZ 8 SURGICAL PF LTX - (50PR/BX 4BX/CA)

## (undated) DEVICE — TUBING CLEARLINK DUO-VENT - C-FLO (48EA/CA)

## (undated) DEVICE — OBTURATOR 8MM BLADELESS - (24EA/BX) DA VINCI

## (undated) DEVICE — ELECTRODE DUAL RETURN W/ CORD - (50/PK)

## (undated) DEVICE — PACK GYN DAVINCI (2EA/CA)

## (undated) DEVICE — SOLUTION NORMOSOL-4 INJ 1000ML